# Patient Record
Sex: MALE | Race: WHITE
[De-identification: names, ages, dates, MRNs, and addresses within clinical notes are randomized per-mention and may not be internally consistent; named-entity substitution may affect disease eponyms.]

---

## 2019-01-17 ENCOUNTER — HOSPITAL ENCOUNTER (INPATIENT)
Dept: HOSPITAL 25 - ED | Age: 74
LOS: 2 days | Discharge: HOME | DRG: 193 | End: 2019-01-19
Attending: INTERNAL MEDICINE | Admitting: HOSPITALIST
Payer: MEDICARE

## 2019-01-17 DIAGNOSIS — E78.5: ICD-10-CM

## 2019-01-17 DIAGNOSIS — F10.10: ICD-10-CM
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
## 2019-01-17 DIAGNOSIS — R00.0: ICD-10-CM

## 2019-01-17 DIAGNOSIS — Z86.73: ICD-10-CM

## 2019-01-17 DIAGNOSIS — Z87.891: ICD-10-CM

## 2019-01-17 DIAGNOSIS — Z99.81: ICD-10-CM

## 2019-01-17 DIAGNOSIS — I11.0: ICD-10-CM

## 2019-01-17 DIAGNOSIS — Z82.49: ICD-10-CM

## 2019-01-17 DIAGNOSIS — Z79.01: ICD-10-CM

## 2019-01-17 DIAGNOSIS — Z81.8: ICD-10-CM

## 2019-01-17 DIAGNOSIS — G47.33: ICD-10-CM

## 2019-01-17 DIAGNOSIS — I50.9: ICD-10-CM

## 2019-01-17 DIAGNOSIS — J10.00: Primary | ICD-10-CM

## 2019-01-17 DIAGNOSIS — I48.91: ICD-10-CM

## 2019-01-17 DIAGNOSIS — Z72.89: ICD-10-CM

## 2019-01-17 DIAGNOSIS — M19.90: ICD-10-CM

## 2019-01-17 DIAGNOSIS — M10.9: ICD-10-CM

## 2019-01-17 DIAGNOSIS — J44.0: ICD-10-CM

## 2019-01-17 DIAGNOSIS — Z79.51: ICD-10-CM

## 2019-01-17 DIAGNOSIS — E66.01: ICD-10-CM

## 2019-01-17 DIAGNOSIS — J96.01: ICD-10-CM

## 2019-01-17 LAB
ALBUMIN SERPL BCG-MCNC: 4.4 G/DL (ref 3.2–5.2)
ALBUMIN/GLOB SERPL: 1.6 {RATIO} (ref 1–3)
ALP SERPL-CCNC: 67 U/L (ref 34–104)
ALT SERPL W P-5'-P-CCNC: 37 U/L (ref 7–52)
ANION GAP SERPL CALC-SCNC: 9 MMOL/L (ref 2–11)
APTT PPP: 36.3 SECONDS (ref 26–36.3)
AST SERPL-CCNC: 28 U/L (ref 13–39)
BASOPHILS # BLD AUTO: 0.1 10^3/UL (ref 0–0.2)
BUN SERPL-MCNC: 24 MG/DL (ref 6–24)
BUN/CREAT SERPL: 19 (ref 8–20)
CALCIUM SERPL-MCNC: 9.2 MG/DL (ref 8.6–10.3)
CHLORIDE SERPL-SCNC: 98 MMOL/L (ref 101–111)
EOSINOPHIL # BLD AUTO: 0 10^3/UL (ref 0–0.6)
GLOBULIN SER CALC-MCNC: 2.8 G/DL (ref 2–4)
GLUCOSE SERPL-MCNC: 132 MG/DL (ref 70–100)
HCO3 SERPL-SCNC: 29 MMOL/L (ref 22–32)
HCT VFR BLD AUTO: 45 % (ref 42–52)
HGB BLD-MCNC: 15.2 G/DL (ref 14–18)
INR PPP/BLD: 1.59 (ref 0.77–1.02)
LYMPHOCYTES # BLD AUTO: 0.5 10^3/UL (ref 1–4.8)
MCH RBC QN AUTO: 32 PG (ref 27–31)
MCHC RBC AUTO-ENTMCNC: 34 G/DL (ref 31–36)
MCV RBC AUTO: 94 FL (ref 80–94)
MONOCYTES # BLD AUTO: 1.3 10^3/UL (ref 0–0.8)
NEUTROPHILS # BLD AUTO: 9.2 10^3/UL (ref 1.5–7.7)
NRBC # BLD AUTO: 0 10^3/UL
NRBC BLD QL AUTO: 0
PLATELET # BLD AUTO: 241 10^3/UL (ref 150–450)
POTASSIUM SERPL-SCNC: 4 MMOL/L (ref 3.5–5)
PROT SERPL-MCNC: 7.2 G/DL (ref 6.4–8.9)
RBC # BLD AUTO: 4.83 10^6/UL (ref 4–5.4)
SODIUM SERPL-SCNC: 136 MMOL/L (ref 135–145)
WBC # BLD AUTO: 11.1 10^3/UL (ref 3.5–10.8)

## 2019-01-17 PROCEDURE — 84484 ASSAY OF TROPONIN QUANT: CPT

## 2019-01-17 PROCEDURE — 99284 EMERGENCY DEPT VISIT MOD MDM: CPT

## 2019-01-17 PROCEDURE — 36415 COLL VENOUS BLD VENIPUNCTURE: CPT

## 2019-01-17 PROCEDURE — 71046 X-RAY EXAM CHEST 2 VIEWS: CPT

## 2019-01-17 PROCEDURE — 80048 BASIC METABOLIC PNL TOTAL CA: CPT

## 2019-01-17 PROCEDURE — 85610 PROTHROMBIN TIME: CPT

## 2019-01-17 PROCEDURE — 83605 ASSAY OF LACTIC ACID: CPT

## 2019-01-17 PROCEDURE — 87040 BLOOD CULTURE FOR BACTERIA: CPT

## 2019-01-17 PROCEDURE — 82803 BLOOD GASES ANY COMBINATION: CPT

## 2019-01-17 PROCEDURE — 83880 ASSAY OF NATRIURETIC PEPTIDE: CPT

## 2019-01-17 PROCEDURE — 87899 AGENT NOS ASSAY W/OPTIC: CPT

## 2019-01-17 PROCEDURE — 80053 COMPREHEN METABOLIC PANEL: CPT

## 2019-01-17 PROCEDURE — 83735 ASSAY OF MAGNESIUM: CPT

## 2019-01-17 PROCEDURE — 85730 THROMBOPLASTIN TIME PARTIAL: CPT

## 2019-01-17 PROCEDURE — 85025 COMPLETE CBC W/AUTO DIFF WBC: CPT

## 2019-01-17 PROCEDURE — 93005 ELECTROCARDIOGRAM TRACING: CPT

## 2019-01-17 PROCEDURE — 86140 C-REACTIVE PROTEIN: CPT

## 2019-01-17 PROCEDURE — 71045 X-RAY EXAM CHEST 1 VIEW: CPT

## 2019-01-17 RX ADMIN — ALBUTEROL SULFATE PRN MG: 2.5 SOLUTION RESPIRATORY (INHALATION) at 20:15

## 2019-01-17 RX ADMIN — CEFTRIAXONE SODIUM SCH MLS/HR: 1 INJECTION, POWDER, FOR SOLUTION INTRAVENOUS at 22:27

## 2019-01-17 RX ADMIN — ALBUTEROL SULFATE PRN MG: 2.5 SOLUTION RESPIRATORY (INHALATION) at 20:30

## 2019-01-17 NOTE — ED
Shortness of Breath





- HPI Summary


HPI Summary: 


This patient is a 73 year old M presenting to [facility] accompanied by his wife 

with a chief complaint of sudden onset shortness of breath since 18:00. Patient 

received a CXR at [facility] this morning and notes that it was normal.  The 

patient rates the pain 0/10 in severity. Symptoms aggravated by nothing. 

Symptoms alleviated by nothing. Patient reports cough, fever, sore throat and 

HA since 1 day ago. Patient denies being swabbed for the flu while at [facility]. 

Patient notes that he was given Tamiflu and abx at [facility] this morning. Hx COPD

, AFib, sleep apnea, and CHF. Patient notes that he uses a BPAP machine at 

night.








- History of Current Complaint


Chief Complaint: EDShortnessOfBreath


Time Seen by Provider: 01/17/19 19:23


Hx Obtained From: Patient


Onset/Duration: Sudden Onset, Lasting Hours, Still Present


Timing: Constant


Current Severity: Mild


Dyspnea At: Rest


Aggrevating Factors: Other


Alleviating Factors: Other


Associated Signs & Symptoms: Cough (Nonproductive), Fever





- Allergy/Home Medications


Allergies/Adverse Reactions: 


 Allergies











Allergy/AdvReac Type Severity Reaction Status Date / Time


 


No Known Allergies Allergy   Verified 01/17/19 19:17














PMH/Surg Hx/FS Hx/Imm Hx


Endocrine/Hematology History: 


   Denies: Hx Anticoagulant Therapy, Hx Diabetes, Hx Thyroid Disease


Cardiovascular History: Reports: Hx Angina, Hx Atrial Fibrillation, Hx 

Congestive Heart Failure, Hx Hypertension - TREATED WITH MEDICATION


   Denies: Hx Myocardial Infarction, Hx Pacemaker/ICD, Hx Syncope


Respiratory History: Reports: Hx Chronic Obstructive Pulmonary Disease (COPD), 

Hx Sleep Apnea


   Denies: Hx Asthma


 History: 


   Denies: Hx Dialysis, Hx Renal Disease


Musculoskeletal History: Reports: Hx Arthritis, Hx Back Problems, Hx Gout


Sensory History: 


   Denies: Hx Hearing Aid


Neurological History: 


   Denies: Hx Dementia, Hx Migraine, Hx Seizures


Psychiatric History: 


   Denies: Hx Panic Disorder, Hx Substance Abuse





- Surgical History


Surgery Procedure, Year, and Place: 2 LT KNEE SCOPE.  2 RT KNEE SCOPE.  1 LT 

SHOULDER RCT.  4 RADIO FREQUENCY ABLASIONS IN BACK  12/13,12/12,9/30/14.  

APPENDECTOMY 1952.  LASIK EYE.  LUMBAR LAMINECTOMY 4/2015 [facility]


Hx Anesthesia Reactions: No


Infectious Disease History: No


Infectious Disease History: 


   Denies: Hx Hepatitis, Hx Human Immunodeficiency Virus (HIV), Traveled 

Outside the [place] in Last 30 Days





- Family History


Known Family History: Positive: Cardiac Disease, Hypertension





- Social History


Alcohol Use: Daily


Alcohol Amount: 1-5 drinks


Substance Use Type: Reports: None


Smoking Status (MU): Former Smoker


Type: Cigarettes, Cigars


Length of Time of Smoking/Using Tobacco: 1465-3812


Have You Smoked in the Last Year: No





Review of Systems


Positive: Fever


Positive: Sore Throat


Positive: Shortness Of Breath, Cough


Negative: Vomiting


Positive: Headache


All Other Systems Reviewed And Are Negative: Yes





Physical Exam





- Summary


Physical Exam Summary: 


VITAL SIGNS: Reviewed.


GENERAL: Patient is a morbidly obese MALE who is lying comfortable in the 

stretcher. Patient is not in any acute respiratory distress.


HEAD AND FACE: No signs of trauma. No ecchymosis, hematomas or skull 

depressions. No sinus tenderness.


EYES: PERRLA, EOMI x 2, No injected conjunctiva, no nystagmus.


EARS: Hearing grossly intact. Ear canals and tympanic membranes are within 

normal limits.


MOUTH: Oropharynx within normal limits.


NECK: Supple, trachea is midline, no adenopathy, no JVD, no carotid bruit, no c-

spine tenderness, neck with full ROM.


CHEST: Symmetric, no tenderness at palpation


LUNGS: Decreased breath sounds bilaterally. No wheezing or crackles.


CVS: Regular rate and rhythm, S1 and S2 present, no murmurs or gallops 

appreciated.


ABDOMEN: Non-tender. Abdominal distension. No rebound no guarding, and no 

masses palpated. Bowel sounds are normal.


EXTREMITIES: FROM in all major joints, no edema, no cyanosis or clubbing.


NEURO: Alert and oriented x 3. No acute neurological deficits. Speech is normal 

and follows commands.


SKIN: Dry and warm





Triage Information Reviewed: Yes


Vital Signs On Initial Exam: 


 Initial Vitals











Temp Pulse Resp BP Pulse Ox


 


 99.5 F   114   20   140/87   98 


 


 01/17/19 19:13  01/17/19 19:13  01/17/19 19:13  01/17/19 19:13  01/17/19 19:13











Vital Signs Reviewed: Yes





Diagnostics





- Vital Signs


 Vital Signs











  Temp Pulse Resp BP Pulse Ox


 


 01/17/19 19:13  99.5 F  114  20  140/87  98














- Laboratory


Result Diagrams: 


 01/17/19 19:58





 01/17/19 19:58


Lab Statement: Any lab studies that have been ordered have been reviewed, and 

results considered in the medical decision making process.





- Radiology


  ** CXR


Radiology Interpretation Completed By: ED Physician - Dr. Lilly, pending 

official report


Summary of Radiographic Findings: questionable right lower lobe infiltrates





- EKG


  ** 19:19


Cardiac Rate: Tachycardia - at 111 bpm


EKG Rhythm: Atrial Fibrillation


Summary of EKG Findings: AFib at 111 bpm with low voltage





Course/Dx





- Course


Course Of Treatment: This patient is a 73 year old M with hx COPD, AFib, and 

CHF reporting sudden onset shortness of breath since 18:00. Patient reports 

cough, fever, and HA since 1 day ago. Patient denies being swabbed for the flu 

while at [facility] this morning. Patient notes that he was given Tamiflu and abx 

this morning.  An EKG reveals AFib at 111 bpm and low voltage. CXR reveals, per 

ED physician, questionable right lower lobe infiltrates. In the ED course the 

patient was given albuterol, Levaquin, magnesium sulfate, and Solu-Medrol. 

Patient care was discussed with Dr. Chavez, hospitalist, and she agreed to 

admit the patient. Patient will be [place] [facility]. The patient is agreeable 

with this plan.





- Diagnoses


Provider Diagnoses: 


 COPD (chronic obstructive pulmonary disease), Influenza A








- Physician Notifications


Discussed Care of Patient With: Ellie Chavez


Time Discussed With Above Provider: 20:39


Instructed by Provider To: Admit As Inpatient





Discharge





- Sign-Out/Discharge


Documenting (check all that apply): Patient Departure - admitted to [facility]





- Discharge Plan


Condition: Stable


Disposition: ADMITTED TO [facility]


Referrals: 


Tanya Nick MD [Primary Care Provider] - 





- Attestation Statements


Document Initiated by Scribe: Yes


Documenting Scribe: Leia Christensen


Provider For Whom Lakia is Documenting (Include Credential): Krystal Lilly MD


Scribe Attestation: 


Leia LEWIS, scribed for Krystal Lilly MD on 01/17/19 at 2033. 


Status of Scribe Document: Ready

## 2019-01-18 LAB
ANION GAP SERPL CALC-SCNC: 10 MMOL/L (ref 2–11)
BASOPHILS # BLD AUTO: 0 10^3/UL (ref 0–0.2)
BUN SERPL-MCNC: 22 MG/DL (ref 6–24)
BUN/CREAT SERPL: 19.8 (ref 8–20)
CALCIUM SERPL-MCNC: 8.6 MG/DL (ref 8.6–10.3)
CHLORIDE SERPL-SCNC: 100 MMOL/L (ref 101–111)
EOSINOPHIL # BLD AUTO: 0 10^3/UL (ref 0–0.6)
GLUCOSE SERPL-MCNC: 208 MG/DL (ref 70–100)
HCO3 SERPL-SCNC: 26 MMOL/L (ref 22–32)
HCT VFR BLD AUTO: 40 % (ref 42–52)
HGB BLD-MCNC: 13.6 G/DL (ref 14–18)
LYMPHOCYTES # BLD AUTO: 0.3 10^3/UL (ref 1–4.8)
MAGNESIUM SERPL-MCNC: 2.3 MG/DL (ref 1.9–2.7)
MCH RBC QN AUTO: 32 PG (ref 27–31)
MCHC RBC AUTO-ENTMCNC: 34 G/DL (ref 31–36)
MCV RBC AUTO: 94 FL (ref 80–94)
MONOCYTES # BLD AUTO: 0.2 10^3/UL (ref 0–0.8)
NEUTROPHILS # BLD AUTO: 7.3 10^3/UL (ref 1.5–7.7)
NRBC # BLD AUTO: 0 10^3/UL
NRBC BLD QL AUTO: 0
PLATELET # BLD AUTO: 214 10^3/UL (ref 150–450)
POTASSIUM SERPL-SCNC: 3.8 MMOL/L (ref 3.5–5)
RBC # BLD AUTO: 4.26 10^6/UL (ref 4–5.4)
SODIUM SERPL-SCNC: 136 MMOL/L (ref 135–145)
WBC # BLD AUTO: 7.8 10^3/UL (ref 3.5–10.8)

## 2019-01-18 RX ADMIN — GUAIFENESIN AND CODEINE PHOSPHATE PRN ML: 100; 10 SOLUTION ORAL at 20:07

## 2019-01-18 RX ADMIN — GUAIFENESIN SCH MG: 600 TABLET, EXTENDED RELEASE ORAL at 08:27

## 2019-01-18 RX ADMIN — GUAIFENESIN SCH MG: 600 TABLET, EXTENDED RELEASE ORAL at 20:07

## 2019-01-18 RX ADMIN — RIVAROXABAN SCH MG: 20 TABLET, FILM COATED ORAL at 08:27

## 2019-01-18 RX ADMIN — OSELTAMIVIR PHOSPHATE SCH MG: 75 CAPSULE ORAL at 20:07

## 2019-01-18 RX ADMIN — OSELTAMIVIR PHOSPHATE SCH MG: 75 CAPSULE ORAL at 00:21

## 2019-01-18 RX ADMIN — DOXYCYCLINE SCH MLS/HR: 100 INJECTION, POWDER, LYOPHILIZED, FOR SOLUTION INTRAVENOUS at 00:21

## 2019-01-18 RX ADMIN — BENZONATATE PRN MG: 100 CAPSULE ORAL at 20:07

## 2019-01-18 RX ADMIN — DOXYCYCLINE SCH MLS/HR: 100 INJECTION, POWDER, LYOPHILIZED, FOR SOLUTION INTRAVENOUS at 11:57

## 2019-01-18 RX ADMIN — FLUTICASONE FUROATE AND VILANTEROL TRIFENATATE SCH: 100; 25 POWDER RESPIRATORY (INHALATION) at 07:31

## 2019-01-18 RX ADMIN — FLUTICASONE FUROATE AND VILANTEROL TRIFENATATE SCH: 100; 25 POWDER RESPIRATORY (INHALATION) at 20:45

## 2019-01-18 RX ADMIN — ATORVASTATIN CALCIUM SCH MG: 80 TABLET, FILM COATED ORAL at 08:27

## 2019-01-18 RX ADMIN — CEFTRIAXONE SODIUM SCH MLS/HR: 1 INJECTION, POWDER, FOR SOLUTION INTRAVENOUS at 21:23

## 2019-01-18 RX ADMIN — ALLOPURINOL SCH: 300 TABLET ORAL at 08:29

## 2019-01-18 RX ADMIN — OSELTAMIVIR PHOSPHATE SCH MG: 75 CAPSULE ORAL at 08:28

## 2019-01-18 RX ADMIN — GUAIFENESIN AND CODEINE PHOSPHATE PRN ML: 100; 10 SOLUTION ORAL at 16:20

## 2019-01-18 NOTE — PN
Subjective


Date of Service: 01/18/19


Interval History: 








On assessment patient lying in bed without O2 supplementation. O2 sat on room 

air was 90% to 92%. Therefore, 2L replaced and O2 sat 96%. 





Reports he is coughing less frequently and "less violently". 





Reports shortness of breath has mildly improved.





Objective


Active Medications: 








Acetaminophen (Tylenol Tab*)  650 mg PO Q6H PRN


   PRN Reason: FEVER/PAIN


Albuterol/Ipratropium (Duoneb (Albuterol 2.5 Mg/Ipratropium 0.5 Mg))  1 neb INH 

Q6H PRN


   PRN Reason: SOB/WHEEZING


Allopurinol (Zyloprim Tab*)  300 mg PO DAILY [facility]


   Last Admin: 01/18/19 08:29 Dose:  Not Given


Atorvastatin Calcium (Lipitor*)  80 mg PO DAILY [facility]


   Last Admin: 01/18/19 08:27 Dose:  80 mg


Benzonatate (Tessalon Cap*)  100 mg PO BID PRN


   PRN Reason: COUGH


Fluticasone/Vilanterol (Breo Ellipta Mdi 100/25(Nf))  1 puff INH BID [facility]


   Last Admin: 01/18/19 07:31 Dose:  Not Given


Guaifenesin (Mucinex*)  1,200 mg PO BID [facility]


   Last Admin: 01/18/19 08:27 Dose:  1,200 mg


Guaifenesin/Codeine Phosphate (Robitussin Ac 100mg-10mg*)  10 ml PO Q4H PRN


   PRN Reason: COUGH


Sodium Chloride (Ns 0.9% 1000 Ml*)  1,000 mls @ 100 mls/hr IV PER RATE [facility]


Doxycycline Hyclate 100 mg/ (Sodium Chloride)  250 mls @ 250 mls/hr IVPB Q12H 

[facility]


   Last Admin: 01/18/19 00:21 Dose:  250 mls/hr


Ceftriaxone Sodium 1 gm/ (Sodium Chloride)  50 mls @ 200 mls/hr IVPB Q24H [facility]


   Last Admin: 01/17/19 22:27 Dose:  200 mls/hr


Diltiazem HCl 125 mg/ Sodium (Chloride)  125 mls @ 5 mls/hr IV Q25H [facility]; 

Protocol


   Last Admin: 01/18/19 01:16 Dose:  Not Given


Melatonin (Melatonin)  3 mg PO BEDTIME PRN; Protocol


   PRN Reason: SLEEP


   Last Admin: 01/18/19 00:21 Dose:  3 mg


Ondansetron HCl (Zofran Inj*)  4 mg IV Q6H PRN


   PRN Reason: NAUSEA


Oseltamivir Phosphate (Tamiflu Cap*)  75 mg PO BID [facility]


   Stop: 01/21/19 21:01


   Last Admin: 01/18/19 08:28 Dose:  75 mg


Rivaroxaban (Xarelto(*))  20 mg PO DAILY [facility]


   Last Admin: 01/18/19 08:27 Dose:  20 mg








 Vital Signs - 8 hr











  01/18/19 01/18/19 01/18/19





  03:46 03:57 04:30


 


Respiratory   





Rate   


 


Blood Pressure 98/60 92/70 96/58





(mmHg)   














  01/18/19 01/18/19 01/18/19





  05:30 06:30 08:00


 


Respiratory   16





Rate   


 


Blood Pressure 127/86 109/63 





(mmHg)   














  01/18/19 01/18/19





  10:29 10:30


 


Respiratory  





Rate  


 


Blood Pressure 123/85 129/80





(mmHg)  











Oxygen Devices in Use Now: Nasal Cannula


Appearance: Comfortable, NAD


Eyes: No Scleral Icterus


Ears/Nose/Mouth/Throat: Clear Oropharnyx, Mucous Membranes Moist


Neck: NL Appearance and Movements; NL JVP


Respiratory: Symmetrical Chest Expansion and Respiratory Effort, Clear to 

Auscultation


Cardiovascular: NL Sounds; No Murmurs; No JVD, No Edema, - - Irregular


Abdominal: NL Sounds; No Tenderness; No Distention


Lymphatic: No Cervical Adenopathy


Extremities: No Edema


Skin: No Rash or Ulcers


Neurological: Alert and Oriented x 3


Nutrition: Taking PO's


Result Diagrams: 


 01/18/19 05:28





 01/18/19 05:31


Additional Lab and Data: 





 Laboratory Results - last 24 hr











  01/17/19 01/17/19 01/17/19





  19:58 19:58 19:58


 


WBC  11.1 H  


 


RBC  4.83  


 


Hgb  15.2  


 


Hct  45  


 


MCV  94  


 


MCH  32 H  


 


MCHC  34  


 


RDW  13  


 


Plt Count  241  


 


MPV  7.8  


 


Neut % (Auto)  82.8  


 


Lymph % (Auto)  4.6  


 


Mono % (Auto)  11.8  


 


Eos % (Auto)  0.3  


 


Baso % (Auto)  0.5  


 


Absolute Neuts (auto)  9.2 H  


 


Absolute Lymphs (auto)  0.5 L  


 


Absolute Monos (auto)  1.3 H  


 


Absolute Eos (auto)  0  


 


Absolute Basos (auto)  0.1  


 


Absolute Nucleated RBC  0  


 


Nucleated RBC %  0  


 


INR (Anticoag Therapy)   


 


APTT   


 


ABG pH   


 


ABG pCO2   


 


ABG pO2   


 


ABG HCO3   


 


ABG O2 Saturation   


 


ABG Base Excess   


 


Sodium   136 


 


Potassium   4.0 


 


Chloride   98 L 


 


Carbon Dioxide   29 


 


Anion Gap   9 


 


BUN   24 


 


Creatinine   1.26 H 


 


Est GFR ( Amer)   67.9 


 


Est GFR (Non-Af Amer)   56.1 


 


BUN/Creatinine Ratio   19.0 


 


Glucose   132 H 


 


Lactic Acid    1.5


 


Calcium   9.2 


 


Magnesium   


 


Total Bilirubin   0.70 


 


AST   28 


 


ALT   37 


 


Alkaline Phosphatase   67 


 


Troponin I   0.03 


 


C-Reactive Protein   23.44 H 


 


B-Natriuretic Peptide   


 


Total Protein   7.2 


 


Albumin   4.4 


 


Globulin   2.8 


 


Albumin/Globulin Ratio   1.6 


 


Influenza A (Rapid)   














  01/17/19 01/17/19 01/17/19





  19:58 19:58 20:10


 


WBC   


 


RBC   


 


Hgb   


 


Hct   


 


MCV   


 


MCH   


 


MCHC   


 


RDW   


 


Plt Count   


 


MPV   


 


Neut % (Auto)   


 


Lymph % (Auto)   


 


Mono % (Auto)   


 


Eos % (Auto)   


 


Baso % (Auto)   


 


Absolute Neuts (auto)   


 


Absolute Lymphs (auto)   


 


Absolute Monos (auto)   


 


Absolute Eos (auto)   


 


Absolute Basos (auto)   


 


Absolute Nucleated RBC   


 


Nucleated RBC %   


 


INR (Anticoag Therapy)  1.59 H  


 


APTT  36.3  


 


ABG pH    7.46 H


 


ABG pCO2    40


 


ABG pO2    94


 


ABG HCO3    28.2


 


ABG O2 Saturation    98.4 H


 


ABG Base Excess    4.2 H


 


Sodium   


 


Potassium   


 


Chloride   


 


Carbon Dioxide   


 


Anion Gap   


 


BUN   


 


Creatinine   


 


Est GFR ( Amer)   


 


Est GFR (Non-Af Amer)   


 


BUN/Creatinine Ratio   


 


Glucose   


 


Lactic Acid   


 


Calcium   


 


Magnesium   


 


Total Bilirubin   


 


AST   


 


ALT   


 


Alkaline Phosphatase   


 


Troponin I   


 


C-Reactive Protein   


 


B-Natriuretic Peptide   76 


 


Total Protein   


 


Albumin   


 


Globulin   


 


Albumin/Globulin Ratio   


 


Influenza A (Rapid)   














  01/17/19 01/18/19 01/18/19





  20:17 05:28 05:31


 


WBC   7.8 


 


RBC   4.26 


 


Hgb   13.6 L 


 


Hct   40 L 


 


MCV   94 


 


MCH   32 H 


 


MCHC   34 


 


RDW   13 


 


Plt Count   214 


 


MPV   7.7 


 


Neut % (Auto)   93.1 


 


Lymph % (Auto)   4.2 


 


Mono % (Auto)   2.4 


 


Eos % (Auto)   0.1 


 


Baso % (Auto)   0.2 


 


Absolute Neuts (auto)   7.3 


 


Absolute Lymphs (auto)   0.3 L 


 


Absolute Monos (auto)   0.2 


 


Absolute Eos (auto)   0 


 


Absolute Basos (auto)   0 


 


Absolute Nucleated RBC   0 


 


Nucleated RBC %   0 


 


INR (Anticoag Therapy)   


 


APTT   


 


ABG pH   


 


ABG pCO2   


 


ABG pO2   


 


ABG HCO3   


 


ABG O2 Saturation   


 


ABG Base Excess   


 


Sodium    136


 


Potassium    3.8


 


Chloride    100 L


 


Carbon Dioxide    26


 


Anion Gap    10


 


BUN    22


 


Creatinine    1.11


 


Est GFR ( Amer)    78.6


 


Est GFR (Non-Af Amer)    64.9


 


BUN/Creatinine Ratio    19.8


 


Glucose    208 H


 


Lactic Acid   


 


Calcium    8.6


 


Magnesium    2.3


 


Total Bilirubin   


 


AST   


 


ALT   


 


Alkaline Phosphatase   


 


Troponin I   


 


C-Reactive Protein   


 


B-Natriuretic Peptide   


 


Total Protein   


 


Albumin   


 


Globulin   


 


Albumin/Globulin Ratio   


 


Influenza A (Rapid)  Positive A  











Microbiology and Other Data: 


 Microbiology











 01/17/19 22:20 Legionella Urinary Antigen - Final





 Urine    Negative Legionella Antigen





 Streptococcus pneumoniae Ag Screen - Final





    Negative S. pneumo Antigen


 


 01/17/19 19:58 Influenza Types A,B Antigen - Final





 Nasopharyngeal    Specimen received for Influenza A/B Molecular testing














Assess/Plan/Problems-Billing


Assessment: 





73 yr old male with pmh of afib, chf, reactive airway, htn, hld, dante; who 

present with cough and sob and was found to have flu a and pneumonia





- Patient Problems


(1) Acute respiratory failure with hypoxia


Comment: 


- Improving as patient was needing 4L on admission and can maintain saturation 

on 2L


- Cont supplemental O2 and wean as tolerating


- Cont nebs   





(2) Influenza A


Comment: 


- Cont Tamiflu   





(3) Pneumonia


Comment: 


- Repeat chext xray revealed resolving right lower lobe infiltrate


- Cont IV abx, possibly deesculate tomorrow    





(4) Atrial fibrillation with RVR


Comment: 


- Patient is usually on 240 mg Diltizam ER at home, therefore, this restarted 

and Cardizem drip titrated off. 


- Cont tele   





(5) Dyslipidemia


Comment: 


- Cont statin   





(6) HTN (hypertension)


Comment: 


- Normotensive


- Cont Diltiazam   





(7) DANTE (obstructive sleep apnea)


Comment: 


- Cont Cpap   





(8) ETOH abuse


Comment: 


- Report significant etoh intake on admission


- Staff reports he detoxed on previous admission


- [facility] protocol ordered   





(9) DVT prophylaxis


Comment: 


- Xarlto   


Attending: Kenroy Cool

## 2019-01-18 NOTE — HP
CC: Dr. Nick; Dr. Angeles *

 

HISTORY AND PHYSICAL:

 

DATE OF ADMISSION:  19

 

PRIMARY CARE DOCTOR:  Dr. Nick.

 

OUTPATIENT CARDIOLOGIST:  Dr. Angeles.

 

MY ATTENDING PHYSICIAN WHILE IN THE HOSPITAL: Dr. Ellie Stanton* (DICTATED BY 
SHERIDAN NAVARRO)

 

CHIEF COMPLAINT:  Shortness of breath and cough x1 day.

 

HISTORY OF PRESENT ILLNESS:  Mr. Mirza is a 73-year-old patient with a past 
medical history significant for atrial fibrillation, CHF, reactive airway 
disease, hypertension, hyperlipidemia, and ELAINA who presents to the emergency 
department after last night he was in his normal state of health and around 6 
p.m. began to develop a significant cough, which kept him up most of the night, 
but without significant shortness of breath or wheezing.  The patient also had 
no chest pain, increased swelling in his legs, dyspnea on exertion.  The 
patient went to a physician at [place] and had a chest x-ray, which that 
physician there interpreted as being consistent with flu with no focal 
consolidation.  He was prescribed Tamiflu, azithromycin, cough syrup, and was 
discharged home, that was around 3 p.m. At approximately 6:15 p.m., the patient 
suddenly felt like he was unable to catch his breath.  The patient denied any 
audible wheezing, but felt lightheaded.  EMS was activated.  The patient 
received a breathing treatment in the ambulance which helped significantly.  
The patient's lightheadedness went away.  The patient has been having chills 
throughout the day today.  The patient denies any subjective or objective 
fevers.  The patient denies at any point having any chest pain.  The patient 
denies palpitations.  The patient does not know when he goes in and out of 
atrial fibrillation.  The patient is not having any diarrhea, abdominal pain, 
muscle aches.  The patient has not passed out.  The patient now feels like he 
can breathe significantly easier.  The patient in the emergency department was 
found to be influenza A positive and had a chest x-ray with a possible lobar 
infiltrate in the right lower lobe per the ED physician and this reader, it is 
possible interstitial changes that would be consistent with flu or fluid 
overload.  The patient was hypoxic in the ambulance and has been needing 4 L of 
oxygen and has been tachycardic up to the 160s consistently while in the 
emergency department. Due to concerns for flu, possible pneumonia, and 
tachycardia, we are asked to evaluate the patient for admission.

 

PAST MEDICAL HISTORY:  Atrial fibrillation; obstructive sleep apnea; 
hypertension; hyperlipidemia; history of CVA in 2012; history of CHF, unknown EF
; reactive airway disease, recently normal PFTs.

 

PAST SURGICAL HISTORY:  Radiofrequency ablation of the spine, meniscotomy in 
both knees twice, rotator cuff repair.

 

MEDICATIONS:

1.  Diltiazem  mg p.o. daily.

2.  Allopurinol 300 mg p.o. daily.

3.  Tamiflu 75 mg p.o. b.i.d., the patient has taken 1 dose.

4.  Robafen AC 10 mL q.4 hours as needed.

5.  Z-Alistair, the patient has taken 500 mg of this.

6.  Breo Ellipta 125 inhalations, 1 inhalation b.i.d.

7.  Singular 10 mg p.o. daily.

8.  Rosuvastatin 40 mg p.o. daily.

9.  Torsemide 20 mg p.o. daily.

10.  ProAir inhaler 1 puff inhalation q.6 hours as needed.

11.  Xarelto 5 mg p.o. daily.

12.  Sildenafil 100 mg p.o. as needed.

 

ALLERGIES:  No know drug allergies.

 

FAMILY HISTORY:  The patient's mother  with complications of dementia.  The 
patient's father  of heart attack.  The patient has 2 sisters who are 
healthy.

 

SOCIAL HISTORY:  The patient smoked cigarettes from  to .  The patient 
smokes cigars occasionally until recently.  The patient drinks approximately 2 
beers a day, 1 glass of baiely, and 1 glass of red wine.  The patient denies 
illicit drug use.  The patient used to work as a golf pro.  The patient is 
, has 2 children.  The patient's surrogate decision maker will be his 
wife, Merlene Mirza.

 

REVIEW OF SYSTEMS:  A 14-point review of systems were reviewed and revealed 
recent 22-pound weight gain after the patient was unable to exercise to his 
normal routine, but no other significant finding except as noted in the HPI.

 

                               PHYSICAL EXAMINATION

 

GENERAL:  The patient is a 73-year-old male who appears stated age, sitting 
comfortably in bed, in no acute distress.

 

VITAL SIGNS:  Temperature 97.8, pulse rate 112, respiratory rate 25, oxygen 
saturation 97% on 4 L, blood pressure 133/80.

 

HEENT:  Head:  Normocephalic, atraumatic.  Sclerae anicteric.  No conjunctival 
injection.  Nasal mucosa moist.  Oral mucosa moist.  No pharyngeal erythema, 
discharge, or exudate.

 

NECK:  Supple, nontender.  No lymphadenopathy.  No carotid bruit auscultated.  
No JVD.

 

RESPIRATORY:  Diminished.  No wheezes, rales, or rhonchi.  Good air exchange 
bilaterally.

 

HEART:  Tachycardic, irregularly irregular rhythm.  No clicks, murmurs, gallops
, or rubs.  Pulse is 2+ in the dorsalis pedis, posterior tibialis, and radial 
areas. Trace bilateral lower extremity edema.

 

ABDOMEN:  Soft, nontender, nondistended.  Bowel sounds present and normoactive 
in all 4 quadrants.  No hepatosplenomegaly.  No abdominal bruits auscultated.  
No hepatojugular reflux.

 

GENITOURINARY:  No suprapubic or CVA tenderness.

 

SKIN:  Clean, dry, and intact.  No rash.

 

NEUROLOGIC:  Cranial nerves II through XII intact.  No focal deficits.  Alert 
and oriented x3.

 

PSYCHIATRIC:  Pleasant and cooperative.

 

 DIAGNOSTIC STUDIES/LAB DATA:  White blood cell count 11.1, hemoglobin 15.2, 
platelet count 241.  INR 1.59, PTT 36.3.  ABG; pH 7.46, pCO2 of 40, pO2 of 90, 
pCO3 of 28.2, oxygen saturation 98.4%, base excess 4.2.  Sodium 136, potassium 
4.0, chloride 98, carbon dioxide 29, anion gap 9, BUN 24, creatinine 1.26, 
glucose 132, lactic acid 1.5, calcium 9.2.  Bilirubin 0.7, AST 20, ALT 37, 
alkaline phosphatase 67. Troponin I 0.03.  CRP 23.44. BNP 76. Protein 7.2, 
albumin 4.4, globulin 2.8. Influenza A positive.

 

Studies:  Electrocardiogram shows atrial fibrillation with rate of 111; 
borderline ST segment depression in V3, V4, and V5; QTc 499; rate of 111; 
normal axis; no blocks or hypertrophy; compared to previous exam, no 
significant changes.

 

Chest x-ray to this author's view shows interstitial changes with possible 
right lower lobe infiltrate.

 

ASSESSMENT AND PLAN:  Impression:  The patient is a 73-year-old male with past 
medical history significant for atrial fibrillation, hypertension, congestive 
heart failure, and reactive airway disease who presents to the emergency 
department with flu A positive with possible community acquired pneumonia 
superinfection, also with mild chronic obstructive pulmonary disease, who in 
the emergency department was found to be flu A positive and to have 
uncontrolled tachycardia.  The patient will be admitted to the hospital for 
rate control, IV antibiotics, and treatment of his flu.

 

1.  Influenza positive, possible pneumonia, acute hypoxic respiratory failure.  
The patient is currently requiring 4 L oxygen via nasal cannula.  The patient 
has flu A positive and has a likely infiltrate on exam.  The patient will be 
treated with Tamiflu, ceftriaxone, and doxycycline.  The patient received 
Levaquin in the emergency department and also received azithromycin as 
outpatient.  The patient will have repeat chest x-ray in the morning for 
further elucidation of his possible infiltrate.  The patient has a BNP 
currently of 76 and has a known history of heart failure.  The patient's lung 
exam is not consistent with congestive heart failure at this time; however, 
given the patient's already tenuous respiratory status, we will be cautious 
with fluids.  The patient was started on steroids in the emergency department, 
this will not be continued due to lack of predominant wheezing.

2.  Atrial fibrillation with rapid ventricular rate.  The patient's rate is 
fluctuating between the 130s and 160s during examination.  This is likely 
partially due to his respiratory treatments with underlying atrial 
fibrillation.  The patient will be started on normal saline at 100 mL an hour 
for blood pressure support.  The patient has already received 2 boluses of 1000 
mL each.  The patient will be started on diltiazem drip for rate control.  The 
patient had a negative troponin and non-ischemic EKG.  The patient will be 
continued on his Xarelto.  Inpatient cardiology consult can be considered if 
the patient's atrial fibrillation continues to be an issue or he shows signs of 
decompensation or his blood pressure is not able to kept up with adequate rate 
control due to his concurrent infection.

3.  History of congestive heart failure.  The patient does not appear to be in 
congestive heart failure at this time.  The patient has a BNP of 76.  She will 
be monitored closely for signs of fluid overload.  The patient's torsemide will 
be held at this time.

4.  Obstructive sleep apnea.  The patient will have his home CPAP while in the 
hospital.

5.  Hypertension.  The patient will be on diltiazem drip.  The patient will be 
monitored closely for hypotension.

6.  Hyperlipidemia.  Continue Crestor.

7.  Cerebrovascular accident.  This is a non-active issue.  The patient is not 
on any antiplatelet agent.

8.  DVT prophylaxis:  Xarelto.

7.  FEN:  The patient will have fluids and a heart healthy diet without 
caffeine.

8.  Disposition.  The patient is admitted inpatient with estimated length of 
stay greater than 2 midnights.

 

TIME SPENT:  Approximately 1 hour spent on admission of this patient, 35 of 
which was spent face-to-face with the patient obtaining history and physical 
and discussing treatment plan.

 

This plan was discussed with my attending, Dr. Ellie Stanton, and she is in 
agreement.

 

 ____________________________________ SHERIDAN NAVARRO

 

[id number]/CPS #: [id number]

VICK

## 2019-01-19 VITALS — DIASTOLIC BLOOD PRESSURE: 85 MMHG | SYSTOLIC BLOOD PRESSURE: 148 MMHG

## 2019-01-19 RX ADMIN — GUAIFENESIN SCH MG: 600 TABLET, EXTENDED RELEASE ORAL at 08:53

## 2019-01-19 RX ADMIN — BENZONATATE PRN MG: 100 CAPSULE ORAL at 08:53

## 2019-01-19 RX ADMIN — ALLOPURINOL SCH: 300 TABLET ORAL at 08:54

## 2019-01-19 RX ADMIN — OSELTAMIVIR PHOSPHATE SCH MG: 75 CAPSULE ORAL at 08:52

## 2019-01-19 RX ADMIN — DOXYCYCLINE SCH MLS/HR: 100 INJECTION, POWDER, LYOPHILIZED, FOR SOLUTION INTRAVENOUS at 00:14

## 2019-01-19 RX ADMIN — ATORVASTATIN CALCIUM SCH MG: 80 TABLET, FILM COATED ORAL at 08:52

## 2019-01-19 RX ADMIN — RIVAROXABAN SCH MG: 20 TABLET, FILM COATED ORAL at 08:52

## 2019-01-19 RX ADMIN — FLUTICASONE FUROATE AND VILANTEROL TRIFENATATE SCH: 100; 25 POWDER RESPIRATORY (INHALATION) at 09:55

## 2019-01-19 NOTE — PN
Subjective


Date of Service: 01/19/19


Interval History: 





Mr. Mirza reports that he is feeling better.  He had minimal cough during 

the day yesterday but did have trouble sleeping due to cough overnight.  He is 

tired and weak but feels that he is much improved overall.  He denies chest 

pain and is tolerating oral intake well.  He is eager for discharge to home.











Objective


Active Medications: 





Acetaminophen (Tylenol Tab*)  650 mg PO Q4H PRN


Albuterol/Ipratropium (Duoneb (Albuterol 2.5 Mg/Ipratropium 0.5 Mg))  1 neb INH 

Q6H PRN


Allopurinol (Zyloprim Tab*)  300 mg PO DAILY SHARRI


Atorvastatin Calcium (Lipitor*)  80 mg PO DAILY SHARRI


Benzonatate (Tessalon Cap*)  100 mg PO BID PRN


Diltiazem HCl (Cardizem Cd Cap*)  240 mg PO Q24H SHARRI


Diltiazem HCl (Cardizem Tab*)  30 mg PO Q6HR PRN


Fluticasone/Vilanterol (Breo Ellipta Mdi 100/25(Nf))  1 puff INH BID SHARRI


Folic Acid (Folvite Tab*)  1 mg PO DAILY SHARRI


Guaifenesin (Mucinex*)  1,200 mg PO BID SHARRI


Guaifenesin/Codeine Phosphate (Robitussin Ac 100mg-10mg*)  10 ml PO Q4H PRN


Doxycycline Hyclate 100 mg/ (Sodium Chloride)  250 mls @ 250 mls/hr IVPB Q12H 

SHARRI


Ceftriaxone Sodium 1 gm/ (Sodium Chloride)  50 mls @ 200 mls/hr IVPB Q24H SHARRI


Lorazepam (Ativan Tab(*))  0 - 6 mg PO .PER [facility] PROTOCOL SHARRI; Protocol


Melatonin (Melatonin)  3 mg PO BEDTIME PRN; Protocol


Multivitamins/Minerals (Theragran/Minerals Tab*)  1 tab PO DAILY SHARRI


Ondansetron HCl (Zofran Inj*)  4 mg IV Q6H PRN


Oseltamivir Phosphate (Tamiflu Cap*)  75 mg PO BID SHARRI


Rivaroxaban (Xarelto(*))  20 mg PO DAILY SHARRI


Thiamine HCl (Vitamin B-1 Tab*)  100 mg PO DAILY [facility]





Vital Signs:











Temp Pulse Resp BP Pulse Ox


 


 96.8 F   74   16   110/71   97 


 


 01/19/19 06:15  01/19/19 06:15  01/19/19 06:54  01/19/19 06:15  01/19/19 06:15











Oxygen Devices in Use Now: None


Appearance: Male sitting up in bed in NAD


Eyes: No Scleral Icterus


Ears/Nose/Mouth/Throat: Mucous Membranes Moist


Neck: Trachea Midline


Respiratory: Symmetrical Chest Expansion and Respiratory Effort, Clear to 

Auscultation


Cardiovascular: NL Sounds; No Murmurs; No JVD, No Edema


Abdominal: NL Sounds; No Tenderness; No Distention


Extremities: No Edema


Skin: No Rash or Ulcers


Neurological: Alert and Oriented x 3, NL Muscle Strength and Tone


Nutrition: Taking PO's


Result Diagrams: 


 01/18/19 05:28





 01/18/19 05:31


Additional Lab and Data: 


.


Microbiology and Other Data: 


.





Assess/Plan/Problems-Billing


Assessment: 





Mr. Mirza is a 73 yr old male with pmh of afib, chf, reactive airway, htn, 

hld, elaina; who present with cough and sob and was found to have flu a and 

pneumonia.





- Patient Problems


(1) Acute respiratory failure with hypoxia


Comment: 


- Resolved, SpO2 98% on room air at rest.   





(2) Influenza A


Comment: 


- Cont Tamiflu x 5 days   





(3) Pneumonia


Comment: 


- Repeat chext xray revealed resolving right lower lobe infiltrate


- Complete course of cefpodoxime and azithromycin x 5 days total   





(4) ETOH abuse


Comment: 


- No signs of withdrawal   





(5) Atrial fibrillation with RVR


Comment: 


- Now controlled on home diltiazem   





(6) Dyslipidemia


Comment: 


- Cont statin   





(7) HTN (hypertension)


Comment: 


- Normotensive


- Cont Diltiazam   





(8) ELAINA (obstructive sleep apnea)


Comment: 


- Cont Cpap   





(9) DVT prophylaxis


Comment: 


- Xarelto   


Status and Disposition: 





Inpatient.  Discharge to home.

## 2019-01-19 NOTE — DS
CC:  Dr. Nick *

 

[facility] MEDICINE DISCHARGE SUMMARY:

 

DATE OF ADMISSION:  01/17/19

 

DATE OF DISCHARGE:  01/19/19

 

ATTENDING PHYSICIAN:  Dr. Bertrand * (dictation provided by Kyra Gonzalez NP).

 

PRIMARY CARE PHYSICIAN:  Dr. Nick.

 

PRIMARY DIAGNOSES:

1.  Influenza.

2.  Suspected pneumonia.

3.  Acute hypoxic respiratory failure, now resolved.

4.  Atrial fibrillation with rapid ventricular rate.

 

SECONDARY DIAGNOSES:

1.  History of atrial fibrillation.

2.  Obstructive sleep apnea.

3.  Hypertension.

4.  Hyperlipidemia.

5.  History of cerebrovascular accident in 2012.

6.  History of congestive heart failure, unknown ejection fraction.

7.  Reactive airway disease.

8.  Recently normal pulmonary function tests.

 

PAST SURGICAL HISTORY:

1.  Radiofrequency ablation of the spine.

2.  Meniscotomy in both knees twice.

3.  Rotator cuff repair.

 

MEDICATIONS AT THE TIME OF DISCHARGE:

1.  Rivaroxaban 20 mg p.o. daily.

2.  Torsemide 20 mg p.o. daily.

3.  Sildenafil 100 mg p.o. daily p.r.n.

4.  Rosuvastatin 40 mg p.o. at bedtime.

5.  Tamiflu 75 mg p.o. b.i.d.

6.  Robitussin AC 10 mL p.o. q.4 hours p.r.n.

7.  Montelukast 10 mg p.o. daily.

8.  Fluticasone/vilanterol 200/25 one puff inhaled daily.

9.  Diltiazem  mg p.o. daily.

10.  Azithromycin 500 mg p.o. daily to complete a 5-day course.

11.  Allopurinol 150 mg p.o. daily.

12.  Guaifenesin ER tabs 1200 mg p.o. b.i.d. p.r.n.

13.  Cefpodoxime 200 mg p.o. q.12 hours to complete a 5-day course.

14.  Benzonatate 100 mg p.o. b.i.d. p.r.n.

15.  Albuterol inhaler 2 puffs inhaled q.4 hours p.r.n.

 

HOSPITAL COURSE:  Mr. Mirza is a 73-year-old male with a past medical 
history as outlined above, who presented to the emergency room on 01/17/19 with 
concern for shortness of breath and cough x1 day.  Please see the dictated H 
and P from SHERIDAN Ervin for complete details.  In brief as described, the 
patient had shortness of breath and a cough.  He suddenly became very short of 
breath and unable to catch his breath, and therefore called the emergency 
medical services and was brought to the hospital.  Here, he was found to be flu 
positive with a chest x-ray showing a possible right lower lobe infiltrate. He 
was requiring 4 L of oxygen to maintain an O2 saturation greater than 90% and 
he was tachycardic with a heart rate into the 160s with atrial fibrillation 
with RVR.

 

Mr. Mirza was admitted to the hospital.  Tamiflu was initiated.  The patient 
was treated with ceftriaxone and doxycycline with concern for pneumonia.  For 
his atrial fibrillation with RVR, the patient was initially placed on a 
diltiazem infusion for rate control but he is now rate controlled back on his 
home diltiazem dose.  Mr. Mirza has recovered well. This morning, he is off 
of oxygen and has an O2 saturation of 98%.  He is tolerating oral intake well.  
He states he has a cough but that the guaifenesin is helpful.

 

Mr. Mirza is medically stable for discharge to home.  Plans are for him to 
follow up closely with Dr. Nick early next week and to complete a 5-day 
course of antibiotics and treatment of suspected pneumonia.

 

DISPOSITION:  To home.

 

DIET:  Low fat, low salt.

 

ACTIVITY:  As tolerated.

 

FOLLOWUP PLANS:  Please follow up with Dr. Nick early next week regarding 
his acute inpatient hospitalization for flu and pneumonia.

 

TIME SPENT:  Approximately 60 minutes was spent in the discharge of this patient
, more than half the time spent with the patient at the bedside reviewing the 
events leading up to and during this hospitalization, performing the physical 
examination, and reviewing my plan of care.

 

____________________________________ 

KYRA GONZALEZ NP

 

[id number]/CPS #: [id number]

VICK

## 2019-04-25 ENCOUNTER — HOSPITAL ENCOUNTER (EMERGENCY)
Dept: HOSPITAL 25 - UCEAST | Age: 74
Discharge: HOME | End: 2019-04-25
Payer: MEDICARE

## 2019-04-25 VITALS — DIASTOLIC BLOOD PRESSURE: 75 MMHG | SYSTOLIC BLOOD PRESSURE: 116 MMHG
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
## 2019-04-25 DIAGNOSIS — S30.811A: ICD-10-CM

## 2019-04-25 DIAGNOSIS — Z79.01: ICD-10-CM

## 2019-04-25 DIAGNOSIS — I50.9: ICD-10-CM

## 2019-04-25 DIAGNOSIS — S30.1XXA: Primary | ICD-10-CM

## 2019-04-25 DIAGNOSIS — J44.9: ICD-10-CM

## 2019-04-25 DIAGNOSIS — Z87.891: ICD-10-CM

## 2019-04-25 DIAGNOSIS — I48.91: ICD-10-CM
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
## 2019-04-25 DIAGNOSIS — I11.0: ICD-10-CM

## 2019-04-25 PROCEDURE — G0463 HOSPITAL OUTPT CLINIC VISIT: HCPCS

## 2019-04-25 PROCEDURE — 99211 OFF/OP EST MAY X REQ PHY/QHP: CPT

## 2019-04-25 NOTE — UC
Laceration HPI





- HPI Summary


HPI Summary: 





CHIEF COMPLAINT and HPI:  This is a 73-year-old white male with a complex past 

medical history who fell onto the edge of a dog cage yesterday and sustained an 

injury to the right lower quadrant of his abdomen.  He is significantly tender 

to palpation in this area, but can walk and move with minimal distress.  The 

pain has remained constant over the last 24 hours.  The patient denies diffuse 

abdominal pain or problems with bowel movement or urination.  . He is eating, 

drinking, urinating and having normal bowel movements NURSES NOTE REVIEWED:   

"laceration to right side of abd after falling ontop a dog cage door yesterday"

   VITAL SIGNS REVIEWED. Within normal limits except for a pulse of 110.  The 

patient does have atrial fibrillation.





- History Of Current Complaint


Chief Complaint: UCLaceration


Stated Complaint: LAC RIGHT SIDE 2IN


Time Seen by Provider: 04/25/19 14:19


Pain Intensity: 8





- Allergies/Home Medications


Allergies/Adverse Reactions: 


 Allergies











Allergy/AdvReac Type Severity Reaction Status Date / Time


 


No Known Allergies Allergy   Verified 04/25/19 13:34














PMH/Surg Hx/FS Hx/Imm Hx





- Additional Past Medical History


Additional PMH: 





PAST MEDICAL HISTORY is significantly positive for: Laminectomy to the left 

side of the spine, congestive heart failure, COPD, alcohol abuse, atrial 

fibrillation, hypertension. VISIT HISTORY REVIEWED. MEDICATION AND ALLERGY 

REVIEW contributory to current complaint: Patient is on .  Anti-hypertensive 

medication: diltiazem, torsemide.  FAMILY HISTORY is positive for:  

hypertension.  SOCIAL HISTORY is significant for former smoker, lives with wife

, retired.   





Other History Of: 


   Negative For: Anticoagulant Therapy





- Surgical History


Surgical History: Yes


Surgery Procedure, Year, and Place: 2 LT KNEE SCOPE.  2 RT KNEE SCOPE.  1 LT 

SHOULDER RCT.  4 RADIO FREQUENCY ABLASIONS IN BACK  12/13,12/12,9/30/14.  

APPENDECTOMY 1952.  LASIK EYE.  LUMBAR LAMINECTOMY 4/2015 [place]





- Family History


Known Family History: Positive: Cardiac Disease, Hypertension





- Social History


Alcohol Use: Daily


Alcohol Amount: 1-5 drinks


Substance Use Type: None


Smoking Status (MU): Former Smoker


Type: Cigarettes, Cigars


Length of Time of Smoking/Using Tobacco: 1813-2932


Have You Smoked in the Last Year: No


When Did the Patient Quit Smoking/Using Tobacco: 1974





- Immunization History


Most Recent Influenza Vaccination: 2018


Most Recent Tetanus Shot: Unsure


Most Recent Pneumonia Vaccination: unkown-says he has had it





Review of Systems


All Other Systems Reviewed And Are Negative: Yes


Constitutional: Positive: Negative


Respiratory: Positive: Negative.  Negative: Shortness Of Breath


Cardiovascular: Positive: Negative.  Negative: Palpitations


Gastrointestinal: Positive: Abdominal Pain.  Negative: Vomiting, Diarrhea, 

Nausea - area of contusion


Genitourinary: Positive: Negative


Is Patient Immunocompromised?: No





Physical Exam





- Summary


Physical Exam Summary: 





Appearance: The patient is well-appearing, is in no pain or distress, and is 

well-nourished.


Eyes: Conjunctiva are clear.  Pupils are equal and reactive to light and 

accommodation.  Extra ocular muscle movement is intact.


ENT: The hearing is grossly normal, the pharynx is normal, and the TMs are 

normal. There is no muffled or hoarse voice.  No stridor.


Neck: The neck is supple and there is no lymphadenopathy.


Respiratory: The chest is nontender to palpation and without crepitus. The 

lungs are clear, there are normal breath sounds, and there is no respiratory 

distress.  No wheezes, rales or rhonchi.


Cardiovascular: Heart sounds reveal a regular rate and rhythm. There are no 

clicks, rubs or murmurs.  There are no carotid bruits or thrills.  Circulation 

is grossly intact.


Abdomen: The abdomen is soft and nontender. There is no organomegaly.  Bowel 

sounds are present and within normal limits.  No point tenderness at McBurneys 

point.  There is a 6 cm contusion with ecchymosis in the area of the right 

lower quadrant.  There is another 3 cm concentric ring that also ecchymotic, 

but less so.  There is induration center of this contusion and a 2.5 cm, 

crusted abrasion in the middle of the contusion.  Examination of the abdomen is 

negative for peritoneal signs.  Bowel sounds are present.  There is no point 

tenderness over the spleen or the liver.


Musculoskeletal: Strength is intact. The patient moves all extremities.  


Neurological: The patient is alert. Motor and sensory are  examination grossly 

intact.  Speech is normal.


Psychological: The patient displays age appropriate behavior


Skin: Negative for rashes. 





Vital Signs: 


 Initial Vital Signs











Temp  98 F   04/25/19 13:31


 


Pulse  110   04/25/19 13:31


 


Resp  19   04/25/19 13:31


 


BP  116/75   04/25/19 13:31


 


Pulse Ox  99   04/25/19 13:31














Laceration Course/Dx





- Course/Dx


Course Of Treatment: 


CHIEF COMPLAINT and HPI:  This is a 73-year-old white male with a complex past 

medical history who fell onto the edge of a dog cage yesterday and sustained an 

injury to the right lower quadrant of his abdomen.  He is significantly tender 

to palpation in this area, but can walk and move with minimal distress.  The 

pain has remained constant over the last 24 hours.  The patient denies diffuse 

abdominal pain or problems with bowel movement or urination.  . He is eating, 

drinking, urinating and having normal bowel movements NURSES NOTE REVIEWED:   

"laceration to right side of abd after falling ontop a dog cage door yesterday"

   VITAL SIGNS REVIEWED. Within normal limits except for a pulse of 110.  The 

patient does have atrial fibrillation.





PAST MEDICAL HISTORY is significantly positive for: Laminectomy to the left 

side of the spine, congestive heart failure, COPD, alcohol abuse, atrial 

fibrillation, hypertension. VISIT HISTORY REVIEWED. MEDICATION AND ALLERGY 

REVIEW contributory to current complaint: Patient is on .  Anti-hypertensive 

medication: diltiazem, torsemide.  FAMILY HISTORY is positive for:  

hypertension.  SOCIAL HISTORY is significant for former smoker, lives with wife

, retired.   





 REVIEW OF SYSTEMS is significantly positive for abdominal discomfort over area 

of bruise.  





PHYSICAL EXAMINATION is significantly positive for:  6 cm contusion with 

ecchymosis in the area of the right lower quadrant.  There is another 3 cm 

concentric ring that also ecchymotic, but less so.  There is induration center 

of this contusion and a 2.5 cm, crusted abrasion in the middle of the 

contusion.  Examination of the abdomen is negative for peritoneal signs.  Bowel 

sounds are present.  There is no point tenderness over the spleen or the liver.





MEDICAL DECISION MAKING: (Differential Diagnosis; Tests; Final Diagnosis): This 

is a 73-year-old male with a complex medical history who is on an anticoagulant 

and fell approximately 24 hours ago, sustaining a large bruise to his right 

abdomen.  My first concern was for a internal injuries, but his examination was 

unremarkable except for a 8 cm contusion with induration at its center that is 

tender to palpation.  I suspect that this is congealed blood underneath the 

skin that the patient suffered a some increased bleeding because his 

anticoagulant.  My differential would include injury to the abdomen with 

peritoneal bleeding, but the physical examination does not support this 

diagnosis.  I final diagnosis is contusion of the abdominal wall on the right 

side with an area of induration.





PLAN: I discussed with the patient the need for rest and careful observation, 

and that he should go to the emergency department for any increased pain or 

abdominal abnormalities.


MEDICATIONS REVIEWED.  HYPERTENSION STATUS REVIEWED WITH PATIENT.











- Differential Dx - Laceration/Wound


Differental Diagnoses: Abscess, Laceration, Other - contusion





- Diagnosis


Provider Diagnosis: 


 Contusion








Discharge





- Sign-Out/Discharge


Documenting (check all that apply): Patient Departure


All imaging exams completed and their final reports reviewed: No Studies





- Discharge Plan


Condition: Stable


Disposition: HOME


Patient Education Materials:  Contusion in Adults (ED)


Referrals: 


Tanya Nick MD [Primary Care Provider] - 


Additional Instructions: 


AS WE DISCUSSED: 


 


PLEASE SEEK CARE AT THE EMERGENCY DEPARTMENT IF SYMPTOMS WORSEN OR IF NEW 

SYMPTOMS DEVELOP.  





FOLLOW UP WITH YOUR PRIMARY CARE PHYSICIAN IF CONDITION CONTINUES BEYOND 3 DAYS 

WITHOUT IMPROVEMENT.





We are open from 7 a.m. to 10 p.m.  Call us with any questions or concerns.





YOUR DIAGNOSIS IS:  contusion of the wall of your abdomen on the right side;  

small abrasion as well.  There is blood clotted under the skin, but your 

stomach examination was normal and there is no injury inside. 





YOUR PRESCRIPTION RECOMMENDATION IS:  None; you can take Tylenol but not 

ibuprofen .





OTHER INSTRUCTIONS:  Rest for the next day or two;  GO TO ED FOR ANY SWELLING 

OF THE AREA OR MORE PAIN IN THE AREA OF THE BRUISE OR THE ABDOMEN;  OR ANY 

PROBLEMS WITH BOWEL MOVEMENTS.














- Billing Disposition and Condition


Condition: STABLE


Disposition: Home

## 2019-07-11 ENCOUNTER — HOSPITAL ENCOUNTER (EMERGENCY)
Dept: HOSPITAL 25 - UCEAST | Age: 74
Discharge: HOME | End: 2019-07-11
Payer: MEDICARE

## 2019-07-11 VITALS — SYSTOLIC BLOOD PRESSURE: 139 MMHG | DIASTOLIC BLOOD PRESSURE: 81 MMHG

## 2019-07-11 DIAGNOSIS — S89.92XA: Primary | ICD-10-CM
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
## 2019-07-11 DIAGNOSIS — I10: ICD-10-CM

## 2019-07-11 DIAGNOSIS — Z87.891: ICD-10-CM
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
## 2019-07-11 PROCEDURE — G0463 HOSPITAL OUTPT CLINIC VISIT: HCPCS

## 2019-07-11 PROCEDURE — 99212 OFFICE O/P EST SF 10 MIN: CPT

## 2019-07-11 NOTE — UC
Lower Extremity/Ankle HPI





- HPI Summary


HPI Summary: 





Mr. Mirza is 100 pound dog clipped him in the left knee about 8:30 this 

morning.  He hit him in the lateral aspect of the knee forcing a valgus injury.

  He's taken a couple of Tylenol about an hour prior to presentation.





- History of Current Complaint


Chief Complaint: UCLowerExtremity


Stated Complaint: LT LEG INJURY


Time Seen by Provider: 07/11/19 10:24


Hx Obtained From: Patient


Onset/Duration: Sudden Onset


Severity Initially: Moderate


Severity Currently: Severe


Pain Intensity: 10


Aggravating Factor(s): Standing, Ambulation


Alleviating Factor(s): Rest


Able to Bear Weight: Yes





- Allergies/Home Medications


Allergies/Adverse Reactions: 


 Allergies











Allergy/AdvReac Type Severity Reaction Status Date / Time


 


No Known Allergies Allergy   Verified 07/11/19 10:17











Home Medications: 


 Home Medications





Acetaminophen [Tylophen] 1,000 mg PO ONCE PRN 07/11/19 [History Confirmed 07/11/ 19]











PMH/Surg Hx/FS Hx/Imm Hx


Endocrine History: Dyslipidemia


Cardiovascular History: Hypertension, Atrial Fibrillation


Other History Of: 


   Negative For: Anticoagulant Therapy





- Surgical History


Surgical History: Yes


Surgery Procedure, Year, and Place: 2 LT KNEE SCOPE.  2 RT KNEE SCOPE.  1 LT 

SHOULDER RCT.  4 RADIO FREQUENCY ABLASIONS IN BACK  12/13,12/12,9/30/14.  

APPENDECTOMY 1952.  LASIK EYE.  LUMBAR LAMINECTOMY 4/2015 [place]





- Family History


Known Family History: Positive: Cardiac Disease, Hypertension





- Social History


Alcohol Use: Daily


Alcohol Amount: 1-5 drinks


Substance Use Type: None


Smoking Status (MU): Former Smoker


Type: Cigarettes, Cigars


Length of Time of Smoking/Using Tobacco: 2757-7983


Have You Smoked in the Last Year: No


When Did the Patient Quit Smoking/Using Tobacco: 1974





- Immunization History


Most Recent Influenza Vaccination: 2018


Most Recent Tetanus Shot: Unsure


Most Recent Pneumonia Vaccination: unkown-says he has had it





Review of Systems


All Other Systems Reviewed And Are Negative: Yes


Motor: Positive: Decreased ROM


Neurovascular: Positive: Negative


Musculoskeletal: Positive: Decreased ROM


Neurological: Positive: Negative





Physical Exam





- Summary


Physical Exam Summary: 





He is nontoxic in appearance with stable vital signs.


Triage Information Reviewed: Yes


Appearance: Well-Appearing, Pain Distress


Vital Signs: 


 Initial Vital Signs











Temp  97.8 F   07/11/19 10:13


 


Pulse  109   07/11/19 10:13


 


Resp  18   07/11/19 10:13


 


BP  139/81   07/11/19 10:13


 


Pulse Ox  95   07/11/19 10:13











Vital Signs Reviewed: Yes


ENT Exam: Normal


Neck exam: Normal


Musculoskeletal Exam: Other - He's tender to any range of motion of his knee.


Neurological Exam: Normal





Diagnostics





- Radiology


  ** left knee


Radiology Interpretation Completed By: Radiologist


Summary of Radiographic Findings: No acute process





Lower Extremity Course/Dx





- Course


Course Of Treatment: 





He's had a couple surgeries on both of his knees in the past and took a good 

blow to the left knee today.  He is tender to any range of motion and will need 

to get some inflammation down before he can be examined properly.  I will place 

him in a knee immobilizer.  He has crutches at home already.  Follow-up with 

orthopedics.





- Differential Dx/Diagnosis


Provider Diagnosis: 


 Knee injury








Discharge





- Sign-Out/Discharge


Documenting (check all that apply): Patient Departure


All imaging exams completed and their final reports reviewed: Yes





- Discharge Plan


Condition: Stable


Disposition: HOME


Patient Education Materials:  Knee Immobilizer (ED), Knee Pain (ED)


Referrals: 


Tanya Nick MD [Primary Care Provider] - 


Main Manning MD [Medical Doctor] - 





- Billing Disposition and Condition


Condition: STABLE


Disposition: Home

## 2019-07-15 ENCOUNTER — HOSPITAL ENCOUNTER (EMERGENCY)
Dept: HOSPITAL 25 - ED | Age: 74
Discharge: HOME | End: 2019-07-15
Payer: MEDICARE

## 2019-07-15 VITALS — DIASTOLIC BLOOD PRESSURE: 88 MMHG | SYSTOLIC BLOOD PRESSURE: 148 MMHG
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
## 2019-07-15 DIAGNOSIS — I50.9: ICD-10-CM

## 2019-07-15 DIAGNOSIS — J44.9: ICD-10-CM

## 2019-07-15 DIAGNOSIS — I48.91: ICD-10-CM
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
## 2019-07-15 DIAGNOSIS — Z79.899: ICD-10-CM

## 2019-07-15 DIAGNOSIS — S89.92XA: Primary | ICD-10-CM

## 2019-07-15 DIAGNOSIS — I20.9: ICD-10-CM

## 2019-07-15 DIAGNOSIS — I11.0: ICD-10-CM

## 2019-07-15 DIAGNOSIS — Z87.891: ICD-10-CM

## 2019-07-15 PROCEDURE — 99283 EMERGENCY DEPT VISIT LOW MDM: CPT

## 2019-07-15 NOTE — ED
Lower Extremity





- HPI Summary


HPI Summary: 





This pt is a 72 y/o male presenting to [facility] via EMS for left knee pain s/p 

injury on 19. Pt reports his 100 lbs dog was running and hit him with 

full force directly on the lateral aspect of the left knee. He notes he went to 

Urgent Care that day and had a left knee XR that was negative for a fracture or 

dislocation. Pt states his left knee is swollen and has been ambulating with 

crutches since then. Pt reports he had a bottle of oxycodone that was 

prescribed by Dr. Cortés in the past but that had  in 2017. He called the 

pharmacy and was told the medication was still good. Pt has been taken  

oxycodone 1 every 6 hours since 2 days ago without any relief. 


PMHx includes 2 knee scopes on both knees. 


Pt is requesting an MRI. 





- History of Current Complaint


Chief Complaint: EDExtremityLower


Stated Complaint: LEFT KNEE INJURY PER EMS


Time Seen by Provider: 07/15/19 10:23


Hx Obtained From: Patient


Mechanism Of Injury: Direct Blow


Onset of Pain: Days


Onset/Duration: Still Present


Severity Currently: Mild


Pain Intensity: 2


Pain Scale Used: 0-10 Numeric


Timing: Lasting Days


Location: Is Discrete @ - left knee


Associated Signs And Symptoms: Positive: Swelling, Knee Pain - left


Aggravating Factor(s): Movement


Alleviating Factor(s): Rest


Able to Bear Weight: No





- Allergies/Home Medications


Allergies/Adverse Reactions: 


 Allergies











Allergy/AdvReac Type Severity Reaction Status Date / Time


 


No Known Allergies Allergy   Verified 19 10:17











Home Medications: 


 Home Medications





Montelukast Sodium TAB* [Singulair 10 MG TAB*] 10 mg PO DAILY 07/15/19 [History 

Confirmed 07/15/19]











PMH/Surg Hx/FS Hx/Imm Hx


Endocrine/Hematology History: 


   Denies: Hx Anticoagulant Therapy, Hx Diabetes, Hx Thyroid Disease


Cardiovascular History: Reports: Hx Angina, Hx Atrial Fibrillation, Hx 

Congestive Heart Failure, Hx Hypertension - TREATED WITH MEDICATION


   Denies: Hx Myocardial Infarction, Hx Pacemaker/ICD, Hx Syncope


Respiratory History: Reports: Hx Chronic Obstructive Pulmonary Disease (COPD), 

Hx Sleep Apnea


   Denies: Hx Asthma


 History: 


   Denies: Hx Dialysis, Hx Renal Disease


Musculoskeletal History: Reports: Hx Arthritis, Hx Back Problems, Hx Gout


Sensory History: 


   Denies: Hx Contacts or Glasses, Hx Hearing Aid


Opthamlomology History: 


   Denies: Hx Contacts or Glasses


Neurological History: 


   Denies: Hx Dementia, Hx Migraine, Hx Seizures


Psychiatric History: 


   Denies: Hx Panic Disorder, Hx Substance Abuse





- Surgical History


Surgical History: Yes


Surgery Procedure, Year, and Place: 2 LT KNEE SCOPE.  2 RT KNEE SCOPE.  1 LT 

SHOULDER RCT.  4 RADIO FREQUENCY ABLASIONS IN BACK  ,,14.  

APPENDECTOMY .  LASIK EYE.  LUMBAR LAMINECTOMY 2015 [place]


Hx Anesthesia Reactions: No


Infectious Disease History: No


Infectious Disease History: 


   Denies: Hx Hepatitis, Hx Human Immunodeficiency Virus (HIV), Traveled 

Outside the [place] in Last 30 Days





- Family History


Known Family History: Positive: Cardiac Disease, Hypertension





- Social History


Alcohol Use: Daily


Alcohol Amount: 1-5 drinks


Substance Use Type: Reports: None


Smoking Status (MU): Former Smoker


Type: Cigarettes, Cigars


Length of Time of Smoking/Using Tobacco: 7090-4661


Have You Smoked in the Last Year: No





Review of Systems


Negative: Fever, Chills


Cardiovascular: Negative


Respiratory: Negative


Positive: Nausea


Musculoskeletal: Other - POSITIVE: left knee pain


Positive: Edema - left knee


All Other Systems Reviewed And Are Negative: Yes





Physical Exam





- Summary


Physical Exam Summary: 





VITAL SIGNS: Reviewed.


GENERAL: Patient is a well-developed and nourished male who is lying 

comfortable in the stretcher. Patient is not in any acute respiratory distress.


HEAD AND FACE: No signs of trauma. No ecchymosis, hematomas or skull 

depressions. No sinus tenderness.


EYES: PERRLA, EOMI x 2, No injected conjunctiva, no nystagmus.


EARS: Hearing grossly intact. Ear canals and tympanic membranes are within 

normal limits.


MOUTH: Oropharynx within normal limits.


NECK: Supple, trachea is midline, no adenopathy, no JVD, no carotid bruit, no c-

spine tenderness, neck with full ROM.


CHEST: Symmetric, no tenderness at palpation


LUNGS: Clear to auscultation bilaterally. No wheezing or crackles.


CVS: Regular rate and rhythm, S1 and S2 present, no murmurs or gallops 

appreciated.


ABDOMEN: Soft, non-tender. No signs of distention. No rebound, no guarding, and 

no masses palpated. Bowel sounds are normal.


EXTREMITIES: Left lower extremity: Decreased ROM of left knee. Swelling of left 

knee. No erythema, no deformity. Tenderness to palpation at left knee. 


NEURO: Alert and oriented x 3. No acute neurological deficits. Speech is normal 

and follows commands.


SKIN: Dry and warm


Triage Information Reviewed: Yes


Vital Signs On Initial Exam: 


 Initial Vitals











Temp Pulse Resp BP Pulse Ox


 


 98.2 F   79   20   119/71   97 


 


 07/15/19 10:24  07/15/19 10:24  07/15/19 10:24  07/15/19 10:24  07/15/19 10:24











Vital Signs Reviewed: Yes





Diagnostics





- Vital Signs


 Vital Signs











  Temp Pulse Resp BP Pulse Ox


 


 07/15/19 10:24  98.2 F  79  20  119/71  97














- Laboratory


Lab Statement: Any lab studies that have been ordered have been reviewed, and 

results considered in the medical decision making process.





- Radiology


  ** Left knee XR (from 19)


Radiology Interpretation Completed By: Radiologist


Summary of Radiographic Findings: IMPRESSION: Osteoarthritis. No acute osseous 

injury. If symptoms persist, recommend repeat imaging. Dr. Mcclain has reviewed 

this report.





Lower Extremity Course/Dx





- Course


Assessment/Plan: Pt is a 72 y/o male presenting to [facility] via EMS for left knee 

pain s/p injury on 19. Pt reports his 100 lbs dog was running and hit him 

with full force directly on the lateral aspect of the left knee. He notes he 

went to Urgent Care that day and had a left knee XR that was negative for a 

fracture or dislocation. Pt states his left knee is swollen and has been 

ambulating with crutches since then. Pt reports he had a bottle of oxycodone 

that was prescribed by Dr. Cortés in the past but that had  in 2017. He 

called the pharmacy and was told the medication was still good. Pt has been 

taken oxycodone 1 every 6 hours since 2 days ago without any relief.  PMHx 

includes 2 knee scopes on both knees.  Patients knee is swollen, with 

decreased range of motion, and the x-ray in the urgent care shows no fracture 

or dislocation therefore I believe that the patient has a tendon or meniscus 

injury.  Therefore, I recommended the patient to use a knee brace, use crutches

, and take pain medications.  However the patient was very insistent to get an 

MRI.  I explained at this point it would not be of benefit to get an MRI and it 

would be better to get an orthopedic consult.  Therefore I discussed the case 

with Dr. López from orthopedics and she recommends to place the patient in a 

knee immobilizer, crutches and pain medication, and follow up with orthopedics 

tomorrow.  However the patient was still very insistent to get an MRI to the 

point where he reported I will not leave the hospital without an MRI.  

Therefore I had Dr. Hendrix, medical director, speak with the patient.  

Afterwards the patient agreed to see orthopedics tomorrow and take pain 

medication as well as use the knee brace and crutches.  Therefore the patient 

was given 1 Percocet and was discharged home with follow-up from orthopedics 

tomorrow.





- Diagnoses


Provider Diagnoses: 


 Left knee pain, Left knee injury








- Physician Notifications


Discussed Care Of Patient With: Gracie López


Time Discussed With Above Provider: 10:40


Instructed by Provider To: Other - Discussed with Dr. López, orthopedist, who 

reports patient does not need an emergent MRI. Pt has seen Dr. Cortés in the 

past and pt responds well to cortisone shots. Dr. López recommends discharging 

the pt home with a knee immobilizer and Dr. Cortés will see pt tomorrow for a 

cortisone injection and further treatment.





Discharge





- Sign-Out/Discharge


Documenting (check all that apply): Patient Departure - Discharge home


Patient Received Moderate/Deep Sedation with Procedure: No





- Discharge Plan


Condition: Stable


Disposition: HOME


Prescriptions: 


Oxycodone HCl/Acetaminophen [Percocet] 1 tab PO Q6H PRN #10 tab MDD 4


 PRN Reason: Pain


Patient Education Materials:  Knee Pain (ED)


Referrals: 


Tanya Nick MD [Primary Care Provider] - 


Main Colindres MD [Medical Doctor] - 


Additional Instructions: 


FOLLOW UP DR. COLINDRES, ORTHOPEDIST, TOMORROW. 





RETURN TO THE ED FOR ANY NEW OR WORSENING SYMPTOMS.





- Billing Disposition and Condition


Condition: STABLE


Disposition: Home





- Attestation Statements


Document Initiated by Scribe: Yes


Documenting Scribe: Gayathri Johnson


Provider For Whom Scribe is Documenting (Include Credential): Rigoberto Mcclain MD


Scribe Attestation: 


Gayathri LEWIS, scribed for Rigoberto Mcclain MD on 07/15/19 at . 


Scribe Documentation Reviewed: Yes


Provider Attestation: 


The documentation as recorded by the scribe, Gayathri Johnson accurately reflects 

the service I personally performed and the decisions made by me, Rigoberto Mcclain MD


Status of Scribe Document: Viewed

## 2019-08-30 ENCOUNTER — HOSPITAL ENCOUNTER (EMERGENCY)
Dept: HOSPITAL 25 - ED | Age: 74
Discharge: HOME | End: 2019-08-30
Payer: MEDICARE

## 2019-08-30 VITALS — DIASTOLIC BLOOD PRESSURE: 88 MMHG | SYSTOLIC BLOOD PRESSURE: 142 MMHG

## 2019-08-30 DIAGNOSIS — Z79.899: ICD-10-CM
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
## 2019-08-30 DIAGNOSIS — I11.0: ICD-10-CM

## 2019-08-30 DIAGNOSIS — I48.91: ICD-10-CM

## 2019-08-30 DIAGNOSIS — J44.9: ICD-10-CM

## 2019-08-30 DIAGNOSIS — Z79.01: ICD-10-CM

## 2019-08-30 DIAGNOSIS — I50.9: ICD-10-CM
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
## 2019-08-30 DIAGNOSIS — S80.12XA: Primary | ICD-10-CM

## 2019-08-30 DIAGNOSIS — Z87.891: ICD-10-CM

## 2019-08-30 PROCEDURE — 99282 EMERGENCY DEPT VISIT SF MDM: CPT

## 2019-08-30 NOTE — ED
Lower Extremity





- HPI Summary


HPI Summary: 


This patient is a 73-year-old male with a history of a recent tibial plateau 

fracture who has been wearing a brace and recently started ambulating more onto 

the leg with minimal discomfort until about 1 week ago when the area began to 

swell.  He is endorsing swelling to the posterior portion of the knee as well 

as the calf.  He is currently on xarelto for A. fib.  Denies any shortness of 

breath.  He is endorsing pain to the posterior knee, mild tenderness to the 

calf without pain to the anterior portion of the knee or ankle.








- History of Current Complaint


Chief Complaint: EDExtremityLower


Stated Complaint: POSS BLOOD CLOT IN LEFT LEG PER PT


Time Seen by Provider: 08/30/19 13:42


Hx Obtained From: Patient


Onset of Pain: Hours


Onset/Duration: Hours


Severity Initially: Moderate


Severity Currently: Moderate


Pain Intensity: 2


Pain Scale Used: 0-10 Numeric


Timing: Constant


Location: Is Discrete @ - left sided posterior knee pain/tenderness


Character Of Pain: Aching


Associated Signs And Symptoms: Positive: Swelling.  Negative: Redness, Bruising

, Weakness


Aggravating Factor(s): Standing, Ambulation


Alleviating Factor(s): Rest


Able to Bear Weight: No





- Risk Factors


Gout Risk Factors: Negative


Septic Arthritis Risk Factor: Negative





- Allergies/Home Medications


Allergies/Adverse Reactions: 


 Allergies











Allergy/AdvReac Type Severity Reaction Status Date / Time


 


No Known Allergies Allergy   Verified 07/11/19 10:17











Home Medications: 


 Home Medications





Albuterol HFA INHALER* [Ventolin HFA Inhaler*] 2 puff INH Q6H PRN 08/30/19 [

History Confirmed 08/30/19]


Colchicine* [Colcrys*] 0.6 mg PO .Q4-6H PRN 08/30/19 [History Confirmed 08/30/19

]


traMADol TAB* [Ultram*] 50 mg PO Q8H PRN 08/30/19 [History Confirmed 08/30/19]











PMH/Surg Hx/FS Hx/Imm Hx


Previously Healthy: Yes


Endocrine/Hematology History: 


   Denies: Hx Anticoagulant Therapy, Hx Diabetes, Hx Thyroid Disease


Cardiovascular History: Reports: Hx Angina, Hx Atrial Fibrillation, Hx 

Congestive Heart Failure, Hx Hypertension - TREATED WITH MEDICATION


   Denies: Hx Myocardial Infarction, Hx Pacemaker/ICD, Hx Syncope


Respiratory History: Reports: Hx Chronic Obstructive Pulmonary Disease (COPD), 

Hx Sleep Apnea


   Denies: Hx Asthma


 History: 


   Denies: Hx Dialysis, Hx Renal Disease


Musculoskeletal History: Reports: Hx Arthritis, Hx Back Problems, Hx Gout


Sensory History: 


   Denies: Hx Contacts or Glasses, Hx Hearing Aid


Opthamlomology History: 


   Denies: Hx Contacts or Glasses


Neurological History: 


   Denies: Hx Dementia, Hx Migraine, Hx Seizures


Psychiatric History: 


   Denies: Hx Panic Disorder, Hx Substance Abuse





- Surgical History


Surgery Procedure, Year, and Place: 2 LT KNEE SCOPE.  2 RT KNEE SCOPE.  1 LT 

SHOULDER RCT.  4 RADIO FREQUENCY ABLASIONS IN BACK  12/13,12/12,9/30/14.  

APPENDECTOMY 1952.  LASIK EYE.  LUMBAR LAMINECTOMY 4/2015 [facility]


Hx Anesthesia Reactions: No





- Immunization History


Hx Pertussis Vaccination: No


Immunizations Up to Date: Yes


Infectious Disease History: No


Infectious Disease History: 


   Denies: Hx Hepatitis, Hx Human Immunodeficiency Virus (HIV), Traveled 

Outside the [place] in Last 30 Days





- Family History


Known Family History: Positive: Cardiac Disease, Hypertension





- Social History


Occupation: Unemployed


Lives: With Family


Alcohol Use: Daily


Alcohol Amount: 1-5 drinks


Hx Substance Use: No


Substance Use Type: Reports: None


Smoking Status (MU): Former Smoker


Type: Cigarettes, Cigars


Length of Time of Smoking/Using Tobacco: 7426-1810


Have You Smoked in the Last Year: No





Review of Systems


Constitutional: Negative


Negative: Fever, Chills, Fatigue, Skin Diaphoresis


Negative: Chest Pain


Negative: Shortness Of Breath, Cough


Negative: Abdominal Pain, Vomiting, Diarrhea, Nausea


Genitourinary: Negative


Positive: no symptoms reported, see HPI


Positive: Arthralgia - left posterior knee pain


Skin: Negative


Neurological: Negative


All Other Systems Reviewed And Are Negative: Yes





Physical Exam


Triage Information Reviewed: Yes


Vital Signs On Initial Exam: 


 Initial Vitals











Temp Pulse Resp BP Pulse Ox


 


 97.8 F   90   16   142/75   97 


 


 08/30/19 13:06  08/30/19 13:06  08/30/19 13:06  08/30/19 13:06  08/30/19 13:06











Vital Signs Reviewed: Yes


Appearance: Positive: Well-Appearing, Well-Nourished


Skin: Positive: Warm, Skin Color Reflects Adequate Perfusion


Head/Face: Positive: Normal Head/Face Inspection


Eyes: Positive: EOMI, MOE, Conjunctiva Clear


Neck: Positive: Supple, No Lymphadenopathy


Respiratory/Lung Sounds: Positive: Clear to Auscultation, Breath Sounds Present


Cardiovascular: Positive: RRR, Pulses are Symmetrical in both Upper and Lower 

Extremities


Musculoskeletal: Positive: Normal, Strength/ROM Intact


Neurological: Positive: Speech Normal


Psychiatric: Positive: Normal, Affect/Mood Appropriate





Diagnostics





- Vital Signs


 Vital Signs











  Temp Pulse Resp BP Pulse Ox


 


 08/30/19 14:39   99   151/86  97


 


 08/30/19 14:34   90   151/86  96


 


 08/30/19 14:33   95    96


 


 08/30/19 13:06  97.8 F  90  16  142/75  97














- Laboratory


Lab Statement: Any lab studies that have been ordered have been reviewed, and 

results considered in the medical decision making process.





Lower Extremity Course/Dx





- Course


Course Of Treatment: This patient is evaluated for left posterior knee pain and 

tenderness with swelling.  Continues to remain ambulatory.  He was able to call 

his orthopedic physician today who recommended he come to the ED due to 

swelling to rule out DVT.  Patient is currently on Xarelto.  DVT US obtained:  

No evidence of DVT is identified.  Likely hematoma in the popliteal fossa as 

described above.  This measures 9.1 x 1.7 x 4.9 cm.  Peroneal veins are limited 

in evaluation.  He is to call his orthopedic surgeon immediately and discuss 

results.  At this time, he will remain on his xarelto and ace wrap the knee.





- Diagnoses


Differential Diagnosis/HQI/PQRI: Positive: Sprain, Strain


Provider Diagnoses: 


 Hematoma








Discharge ED





- Sign-Out/Discharge


Documenting (check all that apply): Patient Departure


Patient Received Moderate/Deep Sedation with Procedure: No





- Discharge Plan


Condition: Stable


Disposition: HOME


Patient Education Materials:  Hematoma (ED)


Referrals: 


Tanya Nick MD [Primary Care Provider] - 


Additional Instructions: 


Please call your ortho MD and let him know your results


Elevate as much as possible (above the heart)








- Billing Disposition and Condition


Condition: STABLE


Disposition: Home





- Attestation Statements


Provider Attestation: 





I was available for consult. This patient was seen by the ISABELA. The patient was 

not presented to, seen by, or examined by me. Chiki Rodriguez MD

## 2019-08-30 NOTE — XMS REPORT
Summary of Care

 Created on:2019



Patient:Jon Mirza

Sex:Male

:1945

External Reference #:[id number]





Demographics







 Address  [address] [place] [address]

 

 Home Phone  [phone]

 

 Work Phone  [phone]

 

 Mobile Phone  [phone]

 

 Email Address  [email or web address]

 

 Preferred Language  English

 

 Marital Status  Not  or 

 

 Synagogue Affiliation  Unknown

 

 Race  White

 

 Ethnic Group  Not  or 









Author







 Organization  [facility]

 

 Address  [address] [facility]



   [place] [address]









Support







 Name  Relationship  Address  Phone

 

 Merlene Mirza  Unavailable  [address] [facility]  [phone]



     [place] [address]  









Care Team Providers







 Name  Role  Phone

 

 Tanya Nick MD  Primary Care Provider  [phone]









Reason for Visit







 Reason  Comments

 

 Follow Up  left knee







Encounter Details







 Date  Type  Department  Care Team  Description

 

 2019  Office Visit  [facility] -  Donald Nguyễn,  Closed 
fracture of



     [place]



  MD



  left tibial plateau



     [address]



  [address] [facility]



  with routine healing,



     [address]



  [place] [address]



  subsequent [place]



     [place] [address]



  [phone]



  (Primary Dx)



     [phone] (Fax)  







Allergies







 Active Allergy  Reactions  Severity  Noted Date  Comments

 

 Environmental  Respiratory Reaction    2011  



documented as of this encounter (statuses as of 2019)



Medications







 Medication  Sig  Dispensed  Refills  Start Date  End Date  Status

 

 albuterol HFA  Take 2 Puffs by  3 Inhaler  3  2018    Active



 (VENTOLIN) 108 (90  inhalation EVERY          



 Base) MCG/ACT  SIX HOURS AS          



 Inhalation Aero  NEEDED (SOB).          



 SolnIndications:            



 Pneumonia of right            



 upper lobe due to            



 infectious organism            



 (HCC)            

 

 Sildenafil Citrate  Take 1 Tab by  27 Tab  3  09/10/2018    Active



 (VIAGRA) 100 MG Oral  mouth DAILY AS          



 TabIndications: Other  NEEDED (ED).          



 male erectile            



 dysfunction            

 

 diltiazem (CARDIZEM  Take 1 Cap by  90 Cap  3  2018    Active



 CD) 240 MG Oral  mouth DAILY.          



 CAPSULE SR 24            



 HRIndications:            



 Permanent atrial            



 fibrillation (HCC)            

 

 XARELTO 20 MG Oral  take 1 tablet by  90 Tab  3  2019    Active



 TabIndications:  mouth once daily          



 Chronic atrial            



 fibrillation (HCC)            

 

 allopurinol (ZYLOPRIM)  take 1/2 tablet by  45 Tab  5  2019    Active



 300 MG Oral  mouth once daily          



 TabIndications:            



 Generalized edema            

 

 Rosuvastatin Calcium  take 1 tablet by  90 Tab  3  2019    Active



 (CRESTOR) 40 MG Oral  mouth once daily          



 TabIndications: Mixed  --STOP PRAVASTATIN          



 hyperlipidemia            

 

 BREO ELLIPTA 100-25  take 1 inhalation  3 Each  6  07/15/2019    Active



 MCG/INH Inhalation  by mouth once          



 AEROSOL POWDER, BREATH  daily          



 ACTIVATEDIndications:            



 Asthma with COPD            



 (chronic obstructive            



 pulmonary disease)            



 (HCC)            

 

 torsemide (DEMADEX) 20  take 1 tablet by  90 Tab  3  07/15/2019    Active



 MG Oral  mouth once daily          



 TabIndications: CAN            



 (dyspnea on exertion)            

 

 tramadol (ULTRAM) 50  Take 1 Tab by  18 Tab  0  2019    Active



 MG Oral  mouth EVERY EIGHT          



 TabIndications:  HOURS AS NEEDED          



 Chronic knee pain,  (pain due to          



 unspecified laterality  fracture). Max          



   Daily Amount: 150          



   mg.          

 

 colchicine (COLCRYS)  Take 1 Tab by  20 Tab  1  08/10/2019    Active



 0.6 MG Oral Tab  mouth EVERY 4-6          



   HOURS PRN (pain).          



documented as of this encounter (statuses as of 2019)



Active Problems







 Problem  Noted Date

 

 Morbid obesity, unspecified obesity type  2019

 

 Prediabetes  2019

 

 Gout  2019

 

 Asthma with COPD (chronic obstructive pulmonary disease)  2019

 

 History of stroke  2018

 

 Chronic bilateral low back pain without sciatica  2018

 

 Allergic rhinitis  2017

 

 Primary osteoarthritis of right knee  2017









 Overview: 







 Added automatically from request for surgery [id number]









 Pseudophakia of both eyes  2017

 

 Acute bilateral thoracic back pain  03/15/2017

 

 Class 1 obesity with body mass index (BMI) of 32.0 to 32.9 in adult  10/17/2016

 

 ELAINA (obstructive sleep apnea)  2014

 

 Atrial fibrillation  10/29/2014









 Overview: 







 [place] [facility] rapid ventricular rate. 14 and nuclear stress Negative 
on this



 admission.









 Essential hypertension, benign  2012

 

 Erectile dysfunction  2012

 

 BMI 34.0-34.9,adult  2012









 Overview: 



sustained wt reduction with portion control and sustained routine exercise. Set 
realistic goal of 1# wt reduction /week set 10 week goals.









 Varicose veins of leg with pain  2011

 

 Hyperlipidemia  10/08/2008

 

 Obesity  10/08/2008









 Overview: 



2007,  BMI:  29.98









 Personal history of colonic polyps  10/08/2008









 Overview: 



Colonoscopy, 2006, one adenomatous polyp and some hypoplastic polyps.  Next 
recommended 3(three) years.



 11/15/2007, Colonoscopy, Dr. Wang, one hyperplastic polyp.  Next recommended 5
(five) years.









 H/O: BPH (Benign Prostatic Hypertrophy)  10/08/2008

 

 Hypertension  10/08/2008

 

 CHF (congestive heart failure), NYHA class I, chronic, diastolic  









 Overview: 







 with pneumonia in hospital 18









 ELAINA on CPAP  



documented as of this encounter (statuses as of 2019)



Resolved Problems







 Problem  Noted Date  Resolved Date

 

 Community acquired pneumonia of right lower lobe of lung  2019

 

 Pneumonia of right upper lobe due to infectious organism  05/15/2018  08/01/
2018

 

 Shoulder pain, left  2013

 

 BMI 29.0-29.9,adult  2012









 Overview: 



sustained wt reduction with portion control and sustained routine exercise. Set 
realistic goal of 1# wt reduction /week set 10 week goals.









 Torn meniscus  10/08/2008  2018









 Overview: 



S/P repair 2007 in [place],



 Replaced inactive diagnosis









 Hypertension  10/08/2008  2012

 

 H/O: Left Rotator Cuff Tear  10/08/2008  2009









 Overview: 



S/P repair in 









 Pain in joint, lower leg  2007



documented as of this encounter (statuses as of 2019)



Immunizations







 Name  Administration Dates  Next Due

 

 Depo Medrol (40mg)  2012  

 

 Influenza (IM) Preservative Free  10/21/2015, 10/08/2014, 2011,  



   10/09/2008  

 

 Influenza Vaccine High Dose  10/03/2018, 2017  

 

 PNEUMOCOCCAL POLYSACCHARIDE VACCINE  2015, 02/10/2014  

 

 Pneumococcal Conjugate(13 Valent)  2017  

 

 TDAP Vaccine  10/16/2017  

 

 ZOSTER (SHINGRIX) VACCINE  2019, 2018, 2018  

 

 ZOSTER (ZOSTAVAX) VACCINE  2015  



documented as of this encounter



Social History







 Tobacco Use  Types  Packs/Day  Years Used  Date

 

 Former Smoker  Cigars  1  12  Quit: 









 Smokeless Tobacco: Never Used      









 Comments: cigars intermittently; 2 a week in summer









 Alcohol Use  Drinks/Week  oz/Week  Comments

 

 Yes  1 Glasses of wine



  31.0  



   1 Cans of beer



    



   1 Shots of liquor



    



   28 Standard drinks or equivalent    









 Sex Assigned at Birth  Date Recorded

 

 Not on file  









 Job Start Date  Occupation  Industry

 

 Not on file  Not on file  Not on file









 Travel History  Travel Start  Travel End









 No recent travel history available.



documented as of this encounter



Last Filed Vital Signs







 Vital Sign  Reading  Time Taken  Comments

 

 Blood Pressure  -  -  

 

 Pulse  -  -  

 

 Temperature  -  -  

 

 Respiratory Rate  16  2019  9:22 AM EDT  

 

 Oxygen Saturation  -  -  

 

 Inhaled Oxygen Concentration  -  -  

 

 Weight  105.2 kg (232 lb)  2019  9:22 AM EDT  

 

 Height  177.8 cm (5' 10")  2019  9:22 AM EDT  

 

 Body Mass Index  33.29  2019  9:22 AM EDT  



documented in this encounter



Progress Notes

Donald Nguyễn MD - 2019  9:15 AM EDTFormatting of this note might be 
different from the original.

PATIENT:  Jon Mirza

MRN:  [id number]

:  1945

DATE OF SERVICE:  2019



SUBJECTIVE:

Jon Mirza is a 73-y.o. male. Here for follow up of left knee lateral 
tibial plateau fracture . He is doing well.



OBJECTIVE:

Resp 16  | Ht 5' 10" (1.778 m)  | Wt 232 lb (105.2 kg)  | BMI 33.29 kg/m

No lateral tenderness.. No swelling.  Neuro intact



ASSESSMENT:

  ICD-9-CM ICD-10-CM

1. Closed fracture of left tibial plateau with routine healing, subsequent 
encounter V54.16 S82.142DXR KNEE 1 OR 2 VIEWS LEFT



PLAN:

Advance weight bearing

Brace of in one week follow up in



Author:  Donald Nguyễn MD 2019 09:30



Portions of this note were made with voice recognition softwareElectronically 
signed by Donald Nguyễn MD at 2019 11:15 AM EDTdocumented in this 
encounter



Plan of Treatment







 Date  Type  Specialty  Care Team  Description

 

 2019  Office Visit  Orthopedics  Donald Nguyễn MD



  



       [address] [place] [place] [address] [phone] (Fax)  

 

 2019  Ocular Visit  Optometry  Terwilliger, Scott, OD



  



       [address] [place] [place] [address] [phone] (Fax)  

 

 10/23/2019  Office Visit  Cardiology  Main Angeles MD



  



       [address] [place] [address]



  



       [phone] (Fax)  

 

 2019  Appointment  Pulmonary    

 

 2019  Office Visit  Pulmonary  Sally Solis MD



  



       [address] [place] [place] [address] [phone] (Fax)  









 Name  Type  Priority  Associated Diagnoses  Date/Time

 

 XR KNEE 1 OR 2 VIEWS  Imaging  Routine  Closed fracture of left  2019  9:
20 AM EDT



 LEFT      tibial plateau with  



       routine healing,  



       subsequent encounter  









 Name  Type  Priority  Associated Diagnoses  Order Schedule

 

 XR KNEE 1 OR 2 VIEWS  Imaging  Routine  Closed fracture of left  Expected: ,



 LEFT      tibial plateau with  Expires: 08/15/2020



       routine healing,  



       subsequent encounter  









 Health Maintenance  Due Date  Last Done  Comments

 

 MEDICARE ANNUAL WELLNESS  1945    



 VISIT      

 

 INFLUENZA VACCINE (#1)  2019  10/03/2018, 2017,  



     10/21/2015, Additional  



     history exists  

 

 DEPRESSION SCREENING  2020, 2012  

 

 DIABETES SCREENING  2020, 2019,  



     2019, Additional  



     history exists  

 

 COLONOSCOPY SCREENING  2020, 2017,  



     2017, Additional  



     history exists  

 

 LIPID DISORDER SCREENING  2020, 10/31/2018,  



     2018, Additional  



     history exists  

 

 FALL RISK ASSESSMENT  2020, 2019  

 

 PNEUMOCOCCAL 65+YRS  Completed  2017, 2015,  



     02/10/2014  

 

 AAA SCREENING/SURVEILLANCE  Completed  2018, 2018  

 

 ZOSTER IMMUNIZATION SERIES  Completed  2019, 2018,  



     2018, Additional  



     history exists  

 

 HPV IMMUNIZATION SERIES  Aged Out    No longer eligible



       based on patient's age



       to complete this topic

 

 MENINGOCOCCAL VACCINE IMM  Aged Out    No longer eligible



       based on patient's age



       to complete this topic



documented as of this encounter



Goals







 Goal  Patient Goal  Associated  Recent  Patient-Stated?  Author



   Type  Problems  Progress    

 

 Blood Pressure  Blood Pressure  Essential  120/70  No  Jimenezbreonnakaiden,



 < 140/90    hypertension,  (2019    Paola,



     benign  12:16 PM EDT)    MD









 Note: 



Hypertension Care Plan



 



 Based on the patient's clinical history and according to JNC 8 guidelines 
target blood pressure goal is less than 140/90. Based on the patient's last 
blood pressure of BP: 102/72 mmHg the patient is at at goal.



 



 As your provider, it is important that I advise you regarding:



 



 

 your current medications and help you with any challenges you may face 
taking your medications as directed (ex. instructions, cost, side effects, and 
interactions).



 



 

 Important lifestyle changes: weight reduction and diet



 



 

 your clinical goals and how you can achieve success: weight reduction and 
diet improvements



 



 

 medication management: adjusted medications as appropriate



 



 

 patient education/self-management tools provided: Current self-management 
tools adequate



 



 To successfully manage my Hypertension I will:



 



 

 monitor my blood pressure daily, understanding that my goal is less than 140/
90 per my healthcare provider's recommendation. I will schedule an appointment 
with my provider if consistent abnormal readings greater than 160/100.



 



 

 take medications every day as prescribed by my healthcare provider and if 
unable to take them I will discuss with my provider.



 



 

 monitor for symptoms of chest pain, chest tightness/pressure, irregular 
heartbeat, persistent dizziness, radiating arm pain, and neck or jaw pain. If 
any of these symptoms are noticed I will seek medical attention immediately by 
calling 911



 



 

 exercise/walk gets plety of exercise. 5 day(s) per week. If I experience 
chest pain, chest tightness, or shortness of breath, I will seek medical 
attention immediately.



 



 

 follow a diet rich in fruits, vegetables, and low-fat dairy products with 
reduced content of saturated & total fat. I will reduce my sodium intake daily. 
An example is the DASH diet. To obtain



 more information please refer to the DASH Eating Plan listed in Educational 
Resources.



 



 

 record my blood pressure results. [facility] is safe and secure way for you to 
do this in your medical record online.



 



 

 try to obtain an ideal body weight. My recent weight was Weight: 211 lb 8 oz 
(95.936 kg). My weight loss goal for my next office visit is 205#.



 



 

 limit alcohol consumption. For men two drinks per day and women one drink 
per day.



 



 

 if currently smoking, will discuss how to quit smoking with my healthcare 
provider and work towards quitting.



 



 Educational Resources:



 



 National Heart, Lung, & Blood [place] http://nhlbi.nih.gov/hbp/index.html



 The DASH Diet Eating Plan     http://www.nhlbi.nih.gov/health/health-topics/
topics/dash/



 Academy of Nutrition & DIetetics  http://eatright.org



 National Smoking Cessation Site  http://smokefree.gov









 Blood Pressure <  Blood Pressure    120/70 (2019  Tanya Garcia



 150/90      12:16 PM EDT)    MD GUNNER









 Note: 



This is an individualized treatment (blood pressure) goal for Jon Mirza
:



 Displayed above (on the left) is your goal for blood pressure control.  Your 
most recent blood pressure is also shown above, on the right.



 You should try to achieve blood pressures that are lower than your goal listed 
above (on the left).









 Weight increase vs. 18  CHF    8 (2019  9:22 AM  Tanya Garcia mo min (lbs) < 5      EDT)    MD









 Note: 



This is an individualized treatment (congestive heart failure, CHF) goal for 
Jon Mirza:



 Displayed above (on the right) is how many pounds you are in excess of your 
lowest weight over the past 18 months.  Note that lower numbers are better.  
Excessive weight gain often indicates fluid reten



 tion and worsening heart failure.  You should contact your doctor immediately 
if the above number is too high (above your goal, the number on the left).









 Weight loss vs. 18  Lifestyle    6.9 (2019  9:22 AM  Tanya Garcia mo max (lbs) >= 10      EDT)    MD









 Note: 



This is an individualized lifestyle goal for Jon Mirza:



 Your body mass index (BMI) is more than 30.  You should lose weight.  A 
reasonable starting goal is to lose 10 pounds.



 Displayed above is how many pounds you have lost thus far towards your 10 
pound weight loss goal.









 Consume a no-added-salt diet  Lifestyle      No  Tanya Nick MD









 Note: 



This is an individualized lifestyle goal for Jon Mirza:



 Please do not add additional salt to your food.  Additional salt may lead to 
fluid retention and worsen your congestive heart failure.









 Keep immunizations current  Lifestyle      No  Tanya Nick MD









 Note: 



This is an individualized lifestyle goal for Jon Mirza:



 Please be sure to keep up-to-date on recommended immunizations.  For example, 
this would include a yearly influenza vaccine.



 Immunization status can be seen by looking at the Health Maintenance sections 
of your [facility], Plan of Care, and any After Visit Summaries.









 Take all prescribed medications as  Self-management      No  Tanya Nick MD



 directed          









 Note: 



This is an individualized self-management goal for Jon Mirza:



 Please take all prescribed medications as directed.



 1.  Do not skip doses.  If you cannot afford your medications, talk with your 
doctor.



 2.  Use a pill reminder system such as a pill box if needed.  Your pharmacist 
can help you with this.



 3.  Contact your Pharmacy 5 days before your medication runs out.  If you 
cannot take your medications for any reasons, talk with your doctor.



 4.  Please bring all of your medication bottles and inhalers (or a list of all 
your medications/inhalers) with you to every visit.



 Potential barriers to meeting all of your care plan goals will continue to be 
addressed on an ongoing basis.









 Check your weight daily  Self-management      No  Tanya Nick MD









 Note: 



This is an individualized self-management goal for Jon Mirza:



 Please check your weight daily.  Refer to the accompanying CHF treatment goal 
and call your doctor immediately for further instructions on how to respond to 
unexpected weight gain.



documented as of this encounter



Implants







 Implanted  Type  Area    Device  Shelf  Model / Serial



         Identifier  Expiration Date  / Lot

 

 Iol, [id number] 20.5 Diopter - [id number]    Right:  [employer]ORZ      [id number] /



 Implanted: Qty: 1 on 2017 by Mateo Waterman MD at [facility]    Eye        [id number] /

 

 Iol, [id number] 20.0 Diopter - [id number]    Left: Eye  STORZ      [id number] /



 Implanted: Qty: 1 on 2017 by Mateo Waterman MD at [facility]            [id number] /



documented as of this encounter



Results

Not on filedocumented in this encounter



Visit Diagnoses







 Diagnosis

 

 Closed fracture of left tibial plateau with routine healing, subsequent 
encounter -



 Primary



documented in this encounter



Insurance







 Payer  Benefit Plan /  Subscriber ID  Effective Dates  Phone  Address  Type



   Group          

 

 MEDICARE  MEDICARE PART A  xxxxxxxxxxx  2010-Presen      Medicare



   & B    t      

 

 [employer]ETNA COMMERCIAL  AETNA  xxxxxxxxxx  2010-Prese      [employer]



       nt      

 

 [facility] EMPIRE  [facility]-EMPIRE PLAN  xxxxxxxxx  2017-Present      [place]









 Guarantor Name  Account Type  Relation to  Date of  Phone  Billing



     Patient  Birth    Address

 

 Jon Mirza  Personal/Family    1945  [phone]  [address]        (Home)



  [place]



         [phone] [id number]



         (Work)  



documented as of this encounter